# Patient Record
Sex: MALE | Race: BLACK OR AFRICAN AMERICAN | NOT HISPANIC OR LATINO | Employment: FULL TIME | URBAN - METROPOLITAN AREA
[De-identification: names, ages, dates, MRNs, and addresses within clinical notes are randomized per-mention and may not be internally consistent; named-entity substitution may affect disease eponyms.]

---

## 2023-02-19 PROBLEM — J30.9 ALLERGIC RHINITIS: Status: ACTIVE | Noted: 2023-02-19

## 2023-02-19 PROBLEM — R07.0 THROAT PAIN: Status: ACTIVE | Noted: 2023-02-19

## 2023-02-19 PROBLEM — N52.9 MALE ERECTILE DISORDER: Status: ACTIVE | Noted: 2023-02-19

## 2023-02-19 PROBLEM — R39.15 URINARY URGENCY: Status: ACTIVE | Noted: 2023-02-19

## 2023-02-19 PROBLEM — S82.123A CLOSED FRACTURE OF LATERAL CONDYLE OF TIBIA: Status: ACTIVE | Noted: 2023-02-19

## 2023-02-19 PROBLEM — E78.5 HYPERLIPIDEMIA: Status: ACTIVE | Noted: 2023-02-19

## 2023-02-19 PROBLEM — E66.9 OBESITY: Status: ACTIVE | Noted: 2023-02-19

## 2023-02-19 PROBLEM — M25.561 RIGHT KNEE PAIN: Status: ACTIVE | Noted: 2023-02-19

## 2023-02-19 PROBLEM — E11.9 DIABETES MELLITUS, TYPE 2 (MULTI): Status: ACTIVE | Noted: 2023-02-19

## 2023-02-19 PROBLEM — I10 HYPERTENSION: Status: ACTIVE | Noted: 2023-02-19

## 2023-02-19 PROBLEM — D3A.026 CARCINOID TUMOR OF RECTUM (CMS-HCC): Status: ACTIVE | Noted: 2023-02-19

## 2023-02-19 PROBLEM — R22.0 LOCALIZED SWELLING, MASS AND LUMP, HEAD: Status: ACTIVE | Noted: 2023-02-19

## 2023-02-19 PROBLEM — R35.0 FREQUENT URINATION: Status: ACTIVE | Noted: 2023-02-19

## 2023-02-19 PROBLEM — S82.141A CLOSED FRACTURE OF RIGHT TIBIAL PLATEAU: Status: ACTIVE | Noted: 2023-02-19

## 2023-02-19 PROBLEM — J36 PERITONSILLAR ABSCESS: Status: ACTIVE | Noted: 2023-02-19

## 2023-02-19 PROBLEM — J35.1 HYPERTROPHY OF TONSIL: Status: ACTIVE | Noted: 2023-02-19

## 2023-02-19 PROBLEM — G47.19 DAYTIME HYPERSOMNOLENCE: Status: ACTIVE | Noted: 2023-02-19

## 2023-02-19 PROBLEM — I10 BENIGN ESSENTIAL HYPERTENSION: Status: ACTIVE | Noted: 2023-02-19

## 2023-02-19 RX ORDER — ATORVASTATIN CALCIUM 40 MG/1
40 TABLET, FILM COATED ORAL DAILY
COMMUNITY
End: 2023-04-28 | Stop reason: SDUPTHER

## 2023-02-19 RX ORDER — METFORMIN HYDROCHLORIDE 500 MG/1
1000 TABLET ORAL 2 TIMES DAILY
COMMUNITY
End: 2023-04-28 | Stop reason: DRUGHIGH

## 2023-02-19 RX ORDER — PEN NEEDLE, DIABETIC 30 GX3/16"
NEEDLE, DISPOSABLE MISCELLANEOUS
COMMUNITY
End: 2024-05-07 | Stop reason: WASHOUT

## 2023-02-19 RX ORDER — ASPIRIN 81 MG/1
1 TABLET ORAL DAILY
COMMUNITY
Start: 2020-08-30 | End: 2023-04-28 | Stop reason: SDUPTHER

## 2023-02-19 RX ORDER — INSULIN LISPRO 100 [IU]/ML
INJECTION, SUSPENSION SUBCUTANEOUS 3 TIMES DAILY
COMMUNITY
End: 2023-05-26

## 2023-02-19 RX ORDER — LANCETS
EACH MISCELLANEOUS
COMMUNITY
End: 2024-05-07 | Stop reason: WASHOUT

## 2023-02-19 RX ORDER — ISOPROPYL ALCOHOL 70 ML/100ML
SWAB TOPICAL
COMMUNITY
End: 2024-05-07 | Stop reason: WASHOUT

## 2023-02-19 RX ORDER — BLOOD SUGAR DIAGNOSTIC
STRIP MISCELLANEOUS
COMMUNITY
Start: 2022-06-13 | End: 2024-05-07 | Stop reason: WASHOUT

## 2023-02-19 RX ORDER — BLOOD-GLUCOSE METER
KIT MISCELLANEOUS
COMMUNITY
Start: 2022-04-22 | End: 2024-05-07 | Stop reason: WASHOUT

## 2023-02-19 RX ORDER — LISINOPRIL 20 MG/1
1 TABLET ORAL DAILY
COMMUNITY
Start: 2019-06-21 | End: 2023-04-28 | Stop reason: SDUPTHER

## 2023-02-19 RX ORDER — AMLODIPINE BESYLATE 10 MG/1
1 TABLET ORAL DAILY
COMMUNITY
Start: 2019-02-15 | End: 2023-04-28 | Stop reason: SDUPTHER

## 2023-02-19 RX ORDER — INSULIN GLARGINE 100 [IU]/ML
5 INJECTION, SOLUTION SUBCUTANEOUS NIGHTLY
COMMUNITY
Start: 2022-05-10 | End: 2023-04-28 | Stop reason: SDUPTHER

## 2023-04-07 ENCOUNTER — APPOINTMENT (OUTPATIENT)
Dept: PRIMARY CARE | Facility: CLINIC | Age: 58
End: 2023-04-07
Payer: COMMERCIAL

## 2023-04-28 ENCOUNTER — OFFICE VISIT (OUTPATIENT)
Dept: PRIMARY CARE | Facility: CLINIC | Age: 58
End: 2023-04-28
Payer: COMMERCIAL

## 2023-04-28 VITALS
SYSTOLIC BLOOD PRESSURE: 147 MMHG | HEIGHT: 68 IN | TEMPERATURE: 98.2 F | OXYGEN SATURATION: 97 % | HEART RATE: 86 BPM | WEIGHT: 178 LBS | DIASTOLIC BLOOD PRESSURE: 98 MMHG | BODY MASS INDEX: 26.98 KG/M2

## 2023-04-28 DIAGNOSIS — I10 BENIGN ESSENTIAL HYPERTENSION: ICD-10-CM

## 2023-04-28 DIAGNOSIS — Z12.11 COLON CANCER SCREENING: ICD-10-CM

## 2023-04-28 DIAGNOSIS — E11.9 TYPE 2 DIABETES MELLITUS WITHOUT COMPLICATION, WITHOUT LONG-TERM CURRENT USE OF INSULIN (MULTI): Primary | ICD-10-CM

## 2023-04-28 DIAGNOSIS — E78.5 HYPERLIPIDEMIA, UNSPECIFIED HYPERLIPIDEMIA TYPE: ICD-10-CM

## 2023-04-28 LAB
POC APPEARANCE, URINE: CLEAR
POC BILIRUBIN, URINE: NEGATIVE
POC BLOOD, URINE: NEGATIVE
POC COLOR, URINE: ABNORMAL
POC FINGERSTICK BLOOD GLUCOSE: 386 MG/DL (ref 70–100)
POC GLUCOSE, URINE: ABNORMAL MG/DL
POC KETONES, URINE: ABNORMAL MG/DL
POC LEUKOCYTES, URINE: NEGATIVE
POC NITRITE,URINE: NEGATIVE
POC PH, URINE: 5.5 PH
POC PROTEIN, URINE: NEGATIVE MG/DL
POC SPECIFIC GRAVITY, URINE: 1.02
POC UROBILINOGEN, URINE: 0.2 EU/DL

## 2023-04-28 PROCEDURE — 81003 URINALYSIS AUTO W/O SCOPE: CPT | Performed by: STUDENT IN AN ORGANIZED HEALTH CARE EDUCATION/TRAINING PROGRAM

## 2023-04-28 PROCEDURE — 82962 GLUCOSE BLOOD TEST: CPT | Performed by: STUDENT IN AN ORGANIZED HEALTH CARE EDUCATION/TRAINING PROGRAM

## 2023-04-28 PROCEDURE — 3080F DIAST BP >= 90 MM HG: CPT | Performed by: STUDENT IN AN ORGANIZED HEALTH CARE EDUCATION/TRAINING PROGRAM

## 2023-04-28 PROCEDURE — 4010F ACE/ARB THERAPY RXD/TAKEN: CPT | Performed by: STUDENT IN AN ORGANIZED HEALTH CARE EDUCATION/TRAINING PROGRAM

## 2023-04-28 PROCEDURE — 99213 OFFICE O/P EST LOW 20 MIN: CPT | Performed by: STUDENT IN AN ORGANIZED HEALTH CARE EDUCATION/TRAINING PROGRAM

## 2023-04-28 PROCEDURE — 1036F TOBACCO NON-USER: CPT | Performed by: STUDENT IN AN ORGANIZED HEALTH CARE EDUCATION/TRAINING PROGRAM

## 2023-04-28 PROCEDURE — 3077F SYST BP >= 140 MM HG: CPT | Performed by: STUDENT IN AN ORGANIZED HEALTH CARE EDUCATION/TRAINING PROGRAM

## 2023-04-28 RX ORDER — INSULIN GLARGINE 100 [IU]/ML
20 INJECTION, SOLUTION SUBCUTANEOUS NIGHTLY
Qty: 3 ML | Refills: 3 | Status: SHIPPED | OUTPATIENT
Start: 2023-04-28 | End: 2023-05-26 | Stop reason: SDUPTHER

## 2023-04-28 RX ORDER — ATORVASTATIN CALCIUM 40 MG/1
40 TABLET, FILM COATED ORAL DAILY
Qty: 90 TABLET | Refills: 1 | Status: SHIPPED | OUTPATIENT
Start: 2023-04-28 | End: 2024-05-04

## 2023-04-28 RX ORDER — AMLODIPINE BESYLATE 10 MG/1
10 TABLET ORAL DAILY
Qty: 90 TABLET | Refills: 1 | Status: SHIPPED | OUTPATIENT
Start: 2023-04-28 | End: 2024-05-04

## 2023-04-28 RX ORDER — ASPIRIN 81 MG/1
81 TABLET ORAL DAILY
Qty: 90 TABLET | Refills: 1 | Status: SHIPPED | OUTPATIENT
Start: 2023-04-28 | End: 2024-05-07 | Stop reason: WASHOUT

## 2023-04-28 RX ORDER — METFORMIN HYDROCHLORIDE 500 MG/1
500 TABLET ORAL
Qty: 60 TABLET | Refills: 11 | Status: SHIPPED | OUTPATIENT
Start: 2023-04-28 | End: 2024-05-07

## 2023-04-28 RX ORDER — LISINOPRIL 20 MG/1
20 TABLET ORAL DAILY
Qty: 90 TABLET | Refills: 1 | Status: SHIPPED | OUTPATIENT
Start: 2023-04-28 | End: 2023-05-26 | Stop reason: DRUGHIGH

## 2023-04-28 ASSESSMENT — PAIN SCALES - GENERAL: PAINLEVEL: 0-NO PAIN

## 2023-04-28 NOTE — PROGRESS NOTES
Subjective   Patient ID: Belén Menon is a 57 y.o. male with PMHx of poorly controlled DM and HTN who presents for Follow-up (For High BP).  HPI    #HTN  - IO BP: 162/112 and 147/98 HR 86 on repeat  - Has BP cuff, but does not like doing it. Occasionally goes to drug store to get BP checked. BP was 139/89. Per patient, SBP is in the 130s-140s.   - Current Medications: Amlodipine 10mg every day, Lisinopril 20mg every day  - Has not taken any of his medications since January because he has been exercising and eating healthier. Lost about 30 pounds since 5/2022  - Diet: Eats mixed vegetables. Endorses low sodium  - Exercise frequently     - Goal: Drinks 8 bottles/day     #T2DM  - Current Medications: Not taking anything currently. Was previously prescribed Lantus 5u and metformin 1000mg BID   - 4/12/22 Hgb A1c 11.2  - Endorses polyuria and polydipsia. Denies any diaphoresis, vision changes, N/V, abdominal pain.   - Was previously prescribed lantus 5u at bedtime  - Has not eaten anything yet today.  - Checked AM BS at home today and it was 330s and it was 386 IO today.   - IO UA showed +4 glucose and +3 ketones  - Diet and exercise as above    #Reported Bloody stool  - Reports 4 episodes of bright red blood after defecation in the past couple of months. States that he was straining at the time. Endorses blood when wiping.   - 2018 Colonoscopy showed one 2mm polyp. Due for repeat in 5 years.   - Denies any abdominal pain. Denies any other symptoms.     Review of Systems   All other systems reviewed and are negative.  Unless mentioned in HPI; 10 systems reviewed     Objective   Physical Exam  Vitals reviewed.   Constitutional:       General: He is not in acute distress.     Appearance: Normal appearance. He is not ill-appearing.   HENT:      Head: Normocephalic and atraumatic.      Right Ear: External ear normal.      Left Ear: External ear normal.      Nose: Nose normal.      Mouth/Throat:      Mouth: Mucous membranes  "are moist.   Eyes:      Extraocular Movements: Extraocular movements intact.      Conjunctiva/sclera: Conjunctivae normal.   Cardiovascular:      Rate and Rhythm: Normal rate and regular rhythm.      Pulses: Normal pulses.      Heart sounds: Normal heart sounds. No murmur heard.     No friction rub. No gallop.   Pulmonary:      Effort: Pulmonary effort is normal. No respiratory distress.      Breath sounds: Normal breath sounds. No wheezing, rhonchi or rales.   Abdominal:      General: Bowel sounds are normal. There is no distension.      Palpations: Abdomen is soft. There is no mass.      Tenderness: There is no abdominal tenderness. There is no guarding or rebound.   Musculoskeletal:         General: No swelling, tenderness or deformity. Normal range of motion.      Cervical back: Normal range of motion and neck supple.      Right lower leg: No edema.      Left lower leg: No edema.   Skin:     General: Skin is warm and dry.      Capillary Refill: Capillary refill takes less than 2 seconds.   Neurological:      General: No focal deficit present.      Mental Status: He is alert and oriented to person, place, and time. Mental status is at baseline.   Psychiatric:         Mood and Affect: Mood normal.         Behavior: Behavior normal.         Thought Content: Thought content normal.         Judgment: Judgment normal.       BP (!) 162/112 (BP Location: Right arm, Patient Position: Sitting, BP Cuff Size: Adult long)   Pulse 86   Temp 36.8 °C (98.2 °F) (Temporal)   Ht 1.727 m (5' 8\")   Wt 80.7 kg (178 lb)   SpO2 97%   BMI 27.06 kg/m²    Lab Results   Component Value Date    WBC 5.4 04/24/2022    HGB 12.2 (L) 04/24/2022    HCT 36.3 (L) 04/24/2022    MCV 82 04/24/2022     (L) 04/24/2022      Lab Results   Component Value Date    GLUCOSE 473 (HH) 04/24/2022    CALCIUM 9.4 04/24/2022     (L) 04/24/2022    K 4.1 04/24/2022    CO2 21 04/24/2022    CL 98 04/24/2022    BUN 16 04/24/2022    CREATININE 1.07 " 04/24/2022        Assessment/Plan      Mr Menon is a 58 yo M who came to the clinic for HTN and DM follow up. Found to be severely hyperglycemic () and has ketonuria (+4). Fortunately asymptomatic, but was recommended for patient to go to the ED for DKA evaluation. Patient declined, and states that he would go to the ED later in the day when he takes care of his obligations. Plan as below:    #T2DM  #IO Hyperglycemia and ketonuria  - Uncontrolled; Not at goal d/t medications non-compliance  - Provided DM education and importance of medications compliance  - Resumed metformin at 500mg BID (with plans for uptitration) and Lantus at an increased dose of 20u at bedtime   - Started Jardiance 10mg every day  - Advised to check BS daily before breakfast and record in log  - Given IO BG and ketonuria, recommended ED transfer but patient declined. Patient expressed understanding of risks and plans to go later in the day. Return/ED precautions discussed. IO insulin-R (only in stock) was not given as it had a longer onset, and we would be unable to recheck IO BS and clinical status as patient needed to leave.   - Encourage healthy diet and exercise.   - Ordered Hgb A1c and urine albumin    #HTN  - Not at goal d/t medications non-compliance; asymptomatic   - Resumed home BP medications: Lisinopril 20mg every day and amlodipine 10mg every day  - Advised to check BP at home a few times a week and record in BP log  - Encourage DASH diet and exercise  - Ordered CMP  - Return/ED precautions discussed     #HLD  - Ordered Lipid panel    #Bloody stool likely anal fissure  - Unable to fully address and evaluate due to running out of time. Patient brought up concern at the end of visit.   - Ordered repeat colonoscopy.       RTC in 1-2 weeks for HTN, DM follow up. Patient also wanted to discuss about GI issues, but ran out of time. Agreeable to see having a close follow up with  clinic    Discussed with Dr. Marvin Lund,  DO  PGY-3 Family Medicine

## 2023-05-01 NOTE — PROGRESS NOTES
I reviewed with the resident the medical history and the resident’s findings on physical examination.  I discussed with the resident the patient’s diagnosis and concur with the treatment plan as documented in the resident note.     Michael Wong MD

## 2023-05-26 ENCOUNTER — OFFICE VISIT (OUTPATIENT)
Dept: PRIMARY CARE | Facility: CLINIC | Age: 58
End: 2023-05-26
Payer: COMMERCIAL

## 2023-05-26 VITALS
OXYGEN SATURATION: 100 % | BODY MASS INDEX: 27.28 KG/M2 | SYSTOLIC BLOOD PRESSURE: 151 MMHG | WEIGHT: 180 LBS | HEART RATE: 75 BPM | HEIGHT: 68 IN | DIASTOLIC BLOOD PRESSURE: 92 MMHG | TEMPERATURE: 97.7 F

## 2023-05-26 DIAGNOSIS — E11.9 TYPE 2 DIABETES MELLITUS WITHOUT COMPLICATION, WITHOUT LONG-TERM CURRENT USE OF INSULIN (MULTI): Primary | ICD-10-CM

## 2023-05-26 DIAGNOSIS — I10 BENIGN ESSENTIAL HYPERTENSION: ICD-10-CM

## 2023-05-26 PROCEDURE — 3080F DIAST BP >= 90 MM HG: CPT | Performed by: STUDENT IN AN ORGANIZED HEALTH CARE EDUCATION/TRAINING PROGRAM

## 2023-05-26 PROCEDURE — 99214 OFFICE O/P EST MOD 30 MIN: CPT | Performed by: STUDENT IN AN ORGANIZED HEALTH CARE EDUCATION/TRAINING PROGRAM

## 2023-05-26 PROCEDURE — 4010F ACE/ARB THERAPY RXD/TAKEN: CPT | Performed by: STUDENT IN AN ORGANIZED HEALTH CARE EDUCATION/TRAINING PROGRAM

## 2023-05-26 PROCEDURE — 3077F SYST BP >= 140 MM HG: CPT | Performed by: STUDENT IN AN ORGANIZED HEALTH CARE EDUCATION/TRAINING PROGRAM

## 2023-05-26 RX ORDER — LISINOPRIL 40 MG/1
40 TABLET ORAL DAILY
Qty: 30 TABLET | Refills: 5 | Status: SHIPPED | OUTPATIENT
Start: 2023-05-26 | End: 2024-05-04 | Stop reason: SDUPTHER

## 2023-05-26 RX ORDER — ACETAMINOPHEN 500 MG
1 TABLET ORAL 3 TIMES WEEKLY
Qty: 1 KIT | Refills: 0 | Status: SHIPPED | OUTPATIENT
Start: 2023-05-26 | End: 2024-05-07 | Stop reason: SDUPTHER

## 2023-05-26 RX ORDER — INSULIN GLARGINE 100 [IU]/ML
35 INJECTION, SOLUTION SUBCUTANEOUS NIGHTLY
Qty: 3 ML | Refills: 3 | Status: SHIPPED | OUTPATIENT
Start: 2023-05-26 | End: 2024-05-07

## 2023-05-26 NOTE — PROGRESS NOTES
I saw and evaluated the patient. I personally obtained the key and critical portions of the history and physical exam or was physically present for key and critical portions performed by the resident/fellow. I reviewed the resident/fellow's documentation and discussed the patient with the resident/fellow. I agree with the resident/fellow's medical decision making as documented in the note.    Michael High MD

## 2023-05-26 NOTE — PROGRESS NOTES
Subjective   Patient ID: Belén Menon is a 57 y.o. male with a PMHx of T2DM and HTN who presents for Follow-up, Hypertension, and Diabetes. Reports that he started all his medications just about a week ago, except for metformin.     HPI    #HTN  - IO /92 HR 75 and similar on repeat   - Current Medications: Lisinopril 20mg every day, amlodipine 10mg every day   - Has BP cuff, but does not work well  - Diet: Endorses low sodium diet. Endorses heavy carbs but is trying to make change. Eating more frozen vegetables and fruits. Would like referral to nutritionist  - Asymptomatic     #T2DM  - Has ophthalmologist and due for follow up  - Current Medications: Lantus 25u at bedtime, Jardiance 10mg every day, but not metformin 500mg BID (although prescribed)   - AM BS on average: 300-500  - Endorses polydipsia and polyuria, but better. Denies any numbness/tingling.   - Exercise: push-ups, run/jump in place  -4/12/22 Hgb A1c 11.2  - Labs ordered at the last office visit were not done. States that he plans to get them done today    Review of Systems   All other systems reviewed and are negative.  Unless mentioned in HPI; 10 systems reviewed     Objective   Physical Exam  Vitals reviewed.   Constitutional:       General: He is not in acute distress.     Appearance: Normal appearance. He is not ill-appearing.   HENT:      Head: Normocephalic and atraumatic.      Right Ear: External ear normal.      Left Ear: External ear normal.      Nose: Nose normal.      Mouth/Throat:      Mouth: Mucous membranes are moist.   Eyes:      Extraocular Movements: Extraocular movements intact.      Conjunctiva/sclera: Conjunctivae normal.   Cardiovascular:      Rate and Rhythm: Normal rate and regular rhythm.      Pulses: Normal pulses.      Heart sounds: Normal heart sounds. No murmur heard.     No friction rub. No gallop.   Pulmonary:      Effort: Pulmonary effort is normal. No respiratory distress.      Breath sounds: Normal breath  "sounds. No wheezing, rhonchi or rales.   Abdominal:      General: Bowel sounds are normal. There is no distension.      Palpations: Abdomen is soft. There is no mass.      Tenderness: There is no abdominal tenderness. There is no guarding or rebound.   Musculoskeletal:         General: No swelling, tenderness or deformity. Normal range of motion.      Cervical back: Normal range of motion and neck supple.      Right lower leg: No edema.      Left lower leg: No edema.   Skin:     General: Skin is warm and dry.      Capillary Refill: Capillary refill takes less than 2 seconds.   Neurological:      General: No focal deficit present.      Mental Status: He is alert and oriented to person, place, and time. Mental status is at baseline.   Psychiatric:         Mood and Affect: Mood normal.         Behavior: Behavior normal.         Thought Content: Thought content normal.         Judgment: Judgment normal.       BP (!) 151/92 (BP Location: Left arm, Patient Position: Sitting)   Pulse 75   Temp 36.5 °C (97.7 °F) (Temporal)   Ht 1.727 m (5' 8\")   Wt 81.6 kg (180 lb)   SpO2 100%   BMI 27.37 kg/m²    Lab Results   Component Value Date    WBC 5.4 04/24/2022    HGB 12.2 (L) 04/24/2022    HCT 36.3 (L) 04/24/2022    MCV 82 04/24/2022     (L) 04/24/2022      Lab Results   Component Value Date    GLUCOSE 473 (HH) 04/24/2022    CALCIUM 9.4 04/24/2022     (L) 04/24/2022    K 4.1 04/24/2022    CO2 21 04/24/2022    CL 98 04/24/2022    BUN 16 04/24/2022    CREATININE 1.07 04/24/2022        Assessment/Plan      Mr Menon is a 58 yo M who came to the clinic for HTN and DM follow up . Both HTN and DM are not well controlled likely due to non-complaince, and possibly inadequate regimen. However, overall clinically stable. Plan as below:    #HTN  - Uncontrolled; asymptomatic   - Increased lisinopril to 40mg every day  - C/w amlodipine 10mg every day  - Ordered BP cuff   - Encourage DASH diet. Ordered nutritionist " referral  - Return/ED precautions discussed    #T2DM  - Uncontrolled; symptomatic  - Increased Lantus to 35u at bedtime  - C/w Jardiance and Metformin 500mg BID (refilled). Strongly encouraged patient to be complaint and take metformin as well.  - Advised to get labs that were ordered at the last office visit (lipid panel, CMP, A1c, urine albumin)  - Encourage healthy diet and exercise. Ordered nutritionist referral  - Return/ED precautions discussed    #Hx of bloody stool likely anal fissure   - Mentioned at the end of the last office visit. NOW RESOLVED     RTC in 2 weeks for HTN and DM follow up    Seen and discussed with Dr. Anila Lund, DO   PGY-3 Family Medicine

## 2023-11-07 ENCOUNTER — PREP FOR PROCEDURE (OUTPATIENT)
Dept: GASTROENTEROLOGY | Facility: HOSPITAL | Age: 58
End: 2023-11-07
Payer: COMMERCIAL

## 2024-03-06 DIAGNOSIS — Z12.11 COLON CANCER SCREENING: Primary | ICD-10-CM

## 2024-03-06 RX ORDER — POLYETHYLENE GLYCOL 3350, SODIUM CHLORIDE, SODIUM BICARBONATE, POTASSIUM CHLORIDE 420; 11.2; 5.72; 1.48 G/4L; G/4L; G/4L; G/4L
4000 POWDER, FOR SOLUTION ORAL ONCE
Qty: 4000 ML | Refills: 0 | Status: SHIPPED | OUTPATIENT
Start: 2024-03-06 | End: 2024-03-06

## 2024-03-06 NOTE — PROGRESS NOTES
Bowel preparation has been prescribed for patient's upcoming colonoscopy procedure.    Petr Geiger MD  Gastroenterology

## 2024-05-04 ENCOUNTER — HOSPITAL ENCOUNTER (EMERGENCY)
Facility: HOSPITAL | Age: 59
Discharge: HOME | End: 2024-05-04
Attending: STUDENT IN AN ORGANIZED HEALTH CARE EDUCATION/TRAINING PROGRAM
Payer: COMMERCIAL

## 2024-05-04 VITALS
SYSTOLIC BLOOD PRESSURE: 165 MMHG | TEMPERATURE: 98 F | RESPIRATION RATE: 16 BRPM | HEIGHT: 68 IN | DIASTOLIC BLOOD PRESSURE: 109 MMHG | BODY MASS INDEX: 23.49 KG/M2 | OXYGEN SATURATION: 98 % | WEIGHT: 155 LBS | HEART RATE: 96 BPM

## 2024-05-04 DIAGNOSIS — R20.2 TINGLING OF BOTH FEET: ICD-10-CM

## 2024-05-04 DIAGNOSIS — R73.9 HYPERGLYCEMIA: Primary | ICD-10-CM

## 2024-05-04 DIAGNOSIS — E78.5 HYPERLIPIDEMIA, UNSPECIFIED HYPERLIPIDEMIA TYPE: ICD-10-CM

## 2024-05-04 DIAGNOSIS — I10 BENIGN ESSENTIAL HYPERTENSION: ICD-10-CM

## 2024-05-04 DIAGNOSIS — E11.9 TYPE 2 DIABETES MELLITUS WITHOUT COMPLICATION, WITHOUT LONG-TERM CURRENT USE OF INSULIN (MULTI): ICD-10-CM

## 2024-05-04 LAB
ANION GAP BLDV CALCULATED.4IONS-SCNC: 11 MMOL/L (ref 10–25)
APPEARANCE UR: CLEAR
BASE EXCESS BLDV CALC-SCNC: 1.8 MMOL/L (ref -2–3)
BASOPHILS # BLD AUTO: 0.03 X10*3/UL (ref 0–0.1)
BASOPHILS NFR BLD AUTO: 0.9 %
BILIRUB UR STRIP.AUTO-MCNC: NEGATIVE MG/DL
BODY TEMPERATURE: 37 DEGREES CELSIUS
CA-I BLDV-SCNC: 1.21 MMOL/L (ref 1.1–1.33)
CHLORIDE BLDV-SCNC: 96 MMOL/L (ref 98–107)
COLOR UR: COLORLESS
EOSINOPHIL # BLD AUTO: 0.06 X10*3/UL (ref 0–0.7)
EOSINOPHIL NFR BLD AUTO: 1.7 %
ERYTHROCYTE [DISTWIDTH] IN BLOOD BY AUTOMATED COUNT: 13 % (ref 11.5–14.5)
EST. AVERAGE GLUCOSE BLD GHB EST-MCNC: 510 MG/DL
GLUCOSE BLDV-MCNC: 328 MG/DL (ref 74–99)
GLUCOSE UR STRIP.AUTO-MCNC: ABNORMAL MG/DL
HBA1C MFR BLD: 19.4 %
HCO3 BLDV-SCNC: 27.7 MMOL/L (ref 22–26)
HCT VFR BLD AUTO: 34.1 % (ref 41–52)
HCT VFR BLD EST: 36 % (ref 41–52)
HGB BLD-MCNC: 12.2 G/DL (ref 13.5–17.5)
HGB BLDV-MCNC: 12 G/DL (ref 13.5–17.5)
HOLD SPECIMEN: NORMAL
IMM GRANULOCYTES # BLD AUTO: 0.01 X10*3/UL (ref 0–0.7)
IMM GRANULOCYTES NFR BLD AUTO: 0.3 % (ref 0–0.9)
INHALED O2 CONCENTRATION: 21 %
KETONES UR STRIP.AUTO-MCNC: ABNORMAL MG/DL
LACTATE BLDV-SCNC: 1.7 MMOL/L (ref 0.4–2)
LEUKOCYTE ESTERASE UR QL STRIP.AUTO: NEGATIVE
LYMPHOCYTES # BLD AUTO: 1.4 X10*3/UL (ref 1.2–4.8)
LYMPHOCYTES NFR BLD AUTO: 40.5 %
MAGNESIUM SERPL-MCNC: 1.97 MG/DL (ref 1.6–2.4)
MCH RBC QN AUTO: 28.1 PG (ref 26–34)
MCHC RBC AUTO-ENTMCNC: 35.8 G/DL (ref 32–36)
MCV RBC AUTO: 79 FL (ref 80–100)
MONOCYTES # BLD AUTO: 0.29 X10*3/UL (ref 0.1–1)
MONOCYTES NFR BLD AUTO: 8.4 %
NEUTROPHILS # BLD AUTO: 1.67 X10*3/UL (ref 1.2–7.7)
NEUTROPHILS NFR BLD AUTO: 48.2 %
NITRITE UR QL STRIP.AUTO: NEGATIVE
NRBC BLD-RTO: 0 /100 WBCS (ref 0–0)
OXYHGB MFR BLDV: 15 % (ref 45–75)
PCO2 BLDV: 48 MM HG (ref 41–51)
PH BLDV: 7.37 PH (ref 7.33–7.43)
PH UR STRIP.AUTO: 5 [PH]
PLATELET # BLD AUTO: 194 X10*3/UL (ref 150–450)
PO2 BLDV: 17 MM HG (ref 35–45)
POTASSIUM BLDV-SCNC: 3.9 MMOL/L (ref 3.5–5.3)
PROT UR STRIP.AUTO-MCNC: NEGATIVE MG/DL
RBC # BLD AUTO: 4.34 X10*6/UL (ref 4.5–5.9)
RBC # UR STRIP.AUTO: NEGATIVE /UL
SAO2 % BLDV: 15 % (ref 45–75)
SODIUM BLDV-SCNC: 131 MMOL/L (ref 136–145)
SP GR UR STRIP.AUTO: 1.03
UROBILINOGEN UR STRIP.AUTO-MCNC: NORMAL MG/DL
WBC # BLD AUTO: 3.5 X10*3/UL (ref 4.4–11.3)

## 2024-05-04 PROCEDURE — 99284 EMERGENCY DEPT VISIT MOD MDM: CPT | Mod: 25

## 2024-05-04 PROCEDURE — 96361 HYDRATE IV INFUSION ADD-ON: CPT

## 2024-05-04 PROCEDURE — 83036 HEMOGLOBIN GLYCOSYLATED A1C: CPT | Performed by: STUDENT IN AN ORGANIZED HEALTH CARE EDUCATION/TRAINING PROGRAM

## 2024-05-04 PROCEDURE — 2500000004 HC RX 250 GENERAL PHARMACY W/ HCPCS (ALT 636 FOR OP/ED): Mod: SE

## 2024-05-04 PROCEDURE — 84132 ASSAY OF SERUM POTASSIUM: CPT | Performed by: STUDENT IN AN ORGANIZED HEALTH CARE EDUCATION/TRAINING PROGRAM

## 2024-05-04 PROCEDURE — 99285 EMERGENCY DEPT VISIT HI MDM: CPT

## 2024-05-04 PROCEDURE — 96374 THER/PROPH/DIAG INJ IV PUSH: CPT

## 2024-05-04 PROCEDURE — 84132 ASSAY OF SERUM POTASSIUM: CPT

## 2024-05-04 PROCEDURE — 83735 ASSAY OF MAGNESIUM: CPT

## 2024-05-04 PROCEDURE — 36415 COLL VENOUS BLD VENIPUNCTURE: CPT

## 2024-05-04 PROCEDURE — 81003 URINALYSIS AUTO W/O SCOPE: CPT

## 2024-05-04 PROCEDURE — 85025 COMPLETE CBC W/AUTO DIFF WBC: CPT

## 2024-05-04 PROCEDURE — 2500000001 HC RX 250 WO HCPCS SELF ADMINISTERED DRUGS (ALT 637 FOR MEDICARE OP): Mod: SE

## 2024-05-04 PROCEDURE — 2500000002 HC RX 250 W HCPCS SELF ADMINISTERED DRUGS (ALT 637 FOR MEDICARE OP, ALT 636 FOR OP/ED): Mod: SE

## 2024-05-04 RX ORDER — ATORVASTATIN CALCIUM 40 MG/1
40 TABLET, FILM COATED ORAL DAILY
Qty: 30 TABLET | Refills: 0 | Status: SHIPPED | OUTPATIENT
Start: 2024-05-04 | End: 2024-06-05 | Stop reason: SDUPTHER

## 2024-05-04 RX ORDER — LISINOPRIL 40 MG/1
40 TABLET ORAL DAILY
Qty: 30 TABLET | Refills: 0 | Status: SHIPPED | OUTPATIENT
Start: 2024-05-04 | End: 2024-06-05 | Stop reason: SDUPTHER

## 2024-05-04 RX ORDER — AMLODIPINE BESYLATE 5 MG/1
10 TABLET ORAL DAILY
Status: DISCONTINUED | OUTPATIENT
Start: 2024-05-04 | End: 2024-05-04 | Stop reason: HOSPADM

## 2024-05-04 RX ORDER — LISINOPRIL 20 MG/1
40 TABLET ORAL DAILY
Status: DISCONTINUED | OUTPATIENT
Start: 2024-05-04 | End: 2024-05-04 | Stop reason: HOSPADM

## 2024-05-04 RX ORDER — DEXTROSE 50 % IN WATER (D50W) INTRAVENOUS SYRINGE
25
Status: DISCONTINUED | OUTPATIENT
Start: 2024-05-04 | End: 2024-05-04 | Stop reason: HOSPADM

## 2024-05-04 RX ORDER — DEXTROSE 50 % IN WATER (D50W) INTRAVENOUS SYRINGE
12.5
Status: DISCONTINUED | OUTPATIENT
Start: 2024-05-04 | End: 2024-05-04 | Stop reason: HOSPADM

## 2024-05-04 RX ORDER — AMLODIPINE BESYLATE 10 MG/1
10 TABLET ORAL DAILY
Qty: 30 TABLET | Refills: 0 | Status: SHIPPED | OUTPATIENT
Start: 2024-05-04 | End: 2024-06-05 | Stop reason: SDUPTHER

## 2024-05-04 RX ADMIN — INSULIN HUMAN 10 UNITS: 100 INJECTION, SOLUTION PARENTERAL at 18:25

## 2024-05-04 RX ADMIN — SODIUM CHLORIDE, POTASSIUM CHLORIDE, SODIUM LACTATE AND CALCIUM CHLORIDE 2000 ML: 600; 310; 30; 20 INJECTION, SOLUTION INTRAVENOUS at 18:13

## 2024-05-04 RX ADMIN — AMLODIPINE BESYLATE 10 MG: 5 TABLET ORAL at 18:25

## 2024-05-04 RX ADMIN — LISINOPRIL 40 MG: 20 TABLET ORAL at 18:25

## 2024-05-04 ASSESSMENT — COLUMBIA-SUICIDE SEVERITY RATING SCALE - C-SSRS
6. HAVE YOU EVER DONE ANYTHING, STARTED TO DO ANYTHING, OR PREPARED TO DO ANYTHING TO END YOUR LIFE?: NO
1. IN THE PAST MONTH, HAVE YOU WISHED YOU WERE DEAD OR WISHED YOU COULD GO TO SLEEP AND NOT WAKE UP?: NO
2. HAVE YOU ACTUALLY HAD ANY THOUGHTS OF KILLING YOURSELF?: NO

## 2024-05-04 NOTE — ED NOTES
The pt reports that he is a diabetic and last blood sugar today was 355. Pt states that he takes no medications for his diabetes at this time.Pt has been having thIs tingling feeling in bilat feet for about 2 weeks. Pt reports increased urination and polydipsia     Brad Torrez RN  05/04/24 4093

## 2024-05-04 NOTE — ED PROVIDER NOTES
HPI   Chief Complaint   Patient presents with    Foot Tingling       58-year-old male with history of noncompliant DM2 (noncompliant on insulin, metformin), HTN (noncompliant on lisinopril, amlodipine), and HLD presents for chief complaint of bilateral foot tingling.  Ongoing for 2 weeks.  Denies injury.  Denies pain.  Denies any numbness and states that he has good sensation, but states that it also feels tingly, as if his feet fell asleep.  Patient states that he lost weight intentionally from 230 pounds down to 155 pounds but states that he has been falling into bad habits again.  States that he did recently start drinking more water, partially due to his excessive thirst and partially due to wanting to change his diet for the better.  States that he has no interest in restarting metformin but might consider other medications.  He is planning on following up with primary care soon.  Denies fever, chills or myalgia.  Denies chest pain or dyspnea.  Denies any frequent urination or changes in urination otherwise.  No changes in bowel movements.  No rashes or swelling or wounds.                          Dodge Coma Scale Score: 15                     Patient History   Past Medical History:   Diagnosis Date    Essential (primary) hypertension 07/27/2020    Benign hypertension    Other conditions influencing health status 05/20/2020    Diabetes mellitus type 2, uncontrolled     No past surgical history on file.  Family History   Problem Relation Name Age of Onset    Diabetes Other      Hypertension Other       Social History     Tobacco Use    Smoking status: Some Days     Types: Cigarettes     Passive exposure: Never    Smokeless tobacco: Never   Substance Use Topics    Alcohol use: Not Currently    Drug use: Not Currently     Types: Marijuana       Physical Exam   ED Triage Vitals [05/04/24 1731]   Temperature Heart Rate Respirations BP   36.7 °C (98 °F) 96 16 (!) 165/109      Pulse Ox Temp Source Heart Rate Source  Patient Position   98 % Temporal Monitor --      BP Location FiO2 (%)     -- --       Physical Exam  Constitutional:       Appearance: Normal appearance.   HENT:      Head: Normocephalic and atraumatic.      Mouth/Throat:      Mouth: Mucous membranes are moist.      Pharynx: Oropharynx is clear.   Eyes:      Extraocular Movements: Extraocular movements intact.      Conjunctiva/sclera: Conjunctivae normal.      Pupils: Pupils are equal, round, and reactive to light.   Cardiovascular:      Rate and Rhythm: Normal rate and regular rhythm.      Pulses: Normal pulses.      Heart sounds: Normal heart sounds.   Pulmonary:      Effort: Pulmonary effort is normal.      Breath sounds: Normal breath sounds.   Abdominal:      General: Abdomen is flat.      Palpations: Abdomen is soft.   Musculoskeletal:         General: Normal range of motion.      Cervical back: Normal range of motion and neck supple.   Feet:      Comments: Bilateral feet on the plantar aspect with subjective tingling without objective findings on exam.  MSPs intact in all extremities.  Amatory steady gait.  Endorses good sensation on my exam.  Cap refill less than 2 seconds in all digits.  No wounds or erythema, no fluctuant masses.  Skin:     General: Skin is warm and dry.      Capillary Refill: Capillary refill takes less than 2 seconds.   Neurological:      General: No focal deficit present.      Mental Status: He is alert and oriented to person, place, and time.      GCS: GCS eye subscore is 4. GCS verbal subscore is 5. GCS motor subscore is 6.      Cranial Nerves: Cranial nerves 2-12 are intact.      Sensory: Sensation is intact.      Motor: Motor function is intact.      Coordination: Coordination is intact.   Psychiatric:         Mood and Affect: Mood normal.         Behavior: Behavior normal.         Thought Content: Thought content normal.         Judgment: Judgment normal.         ED Course & MDM   Diagnoses as of 05/04/24 1904   Hyperglycemia  "  Tingling of both feet       Medical Decision Making  Vital signs reviewed, significant for hypertension at 165/109.  Patient overall is well-appearing and in no apparent distress.  Speaks full sentences without difficulty.  States that he has interest in starting his blood pressure medications again and also would restart other diabetic medications when he follows up with primary care, just not metformin.  States that his sugar earlier today at home when he checked it was over 355.  Diagnostic testing performed.  Blood glucose level on venous full panel was greater than 685.  pH 7.41 and reassuring.  pCO2 low at 38 with bicarb normal at 24.1.  Lactate 1.1 with normal ranges.  Anion gap 15 and reassuring.  We ordered CBC, CMP, magnesium as a result.  Also checking urinalysis.  Started 2 L of LR and gave 10 of regular insulin.  Hypoglycemia order set also placed as precaution.  I discussed with the patient the severe risks regarding uncontrolled diabetes, hypertension, and hyperlipidemia, including neuropathy, which is likely the cause of his \"foot tingling\" today.  We also discussed things like high risk for strokes and heart attacks, as well as nephropathy and retinopathy.  Patient states that he understands and will take it more seriously.  He is interested in following up with primary care and restarting medication gave him doses of his home medications including amlodipine and lisinopril.  I offered metformin again but the patient states that he will not take this medication.  We are able to give him 10 units of regular insulin.  After the fluids finished and insulin had 30 minutes to work, we reassessed.  Repeat venous full panel showed much improved blood glucose level at 328.  Other values on the gas were reassuring again.  Patient was notified of this.  The other labs that were added on, including CBC, CMP, magnesium, A1c, urinalysis all have not returned back yet.  However the patient states that he needs " to leave.  We restarted him on his Jardiance as well as amlodipine, lisinopril, and statin.  Placed order for healthy at home and advised the patient to expect phone calls in the coming days.  Patient was counseled again on the importance of adherence to all medication.  We were unable to convince him to start metformin again.  States that he will implement diet changes and take medications as prescribed.  Advised to return with any new or worsening symptoms and to follow-up soon as possible with primary care as well.  Patient in agreement this plan.  Discharged in stable condition.  Patient not in DKA or HHS.        Procedure  Procedures     Elver Leonardo, EULALIO-RADHA  05/04/24 8961

## 2024-05-04 NOTE — ED TRIAGE NOTES
Pt presents to the ED with feet complaints. Pt states the bottom of his feet started tingling x 2 weeks ago. Pt states this feeling is present in both feet. Pt denies injury or trauma to extremities. Pt denies loss of sensation in bilateral feet. Pt steady ambulating in triage. Pt denies significant medical history, states he is supposed to be taking a medication for his blood pressure but has not received it.

## 2024-05-05 ENCOUNTER — PATIENT OUTREACH (OUTPATIENT)
Dept: CARE COORDINATION | Age: 59
End: 2024-05-05
Payer: COMMERCIAL

## 2024-05-05 LAB — HOLD SPECIMEN: NORMAL

## 2024-05-05 NOTE — PROGRESS NOTES
Spoke with patient today, enrollment SDOH, and SOCRN assessment complete. Discussed Healthy at Home enrollment with patient. Patient asking about medication education. He is also asking for assistance with utilities. Patient does not have a glucometer in the home. Patient aware daily call will begin tomorrow and [facility] initial visit with be on Tuesday.

## 2024-05-06 ENCOUNTER — PATIENT OUTREACH (OUTPATIENT)
Dept: HOME HEALTH SERVICES | Age: 59
End: 2024-05-06
Payer: COMMERCIAL

## 2024-05-06 ENCOUNTER — DOCUMENTATION (OUTPATIENT)
Dept: CARE COORDINATION | Age: 59
End: 2024-05-06
Payer: COMMERCIAL

## 2024-05-06 DIAGNOSIS — I10 BENIGN ESSENTIAL HYPERTENSION: Primary | ICD-10-CM

## 2024-05-06 DIAGNOSIS — E11.9 TYPE 2 DIABETES MELLITUS WITHOUT COMPLICATION, UNSPECIFIED WHETHER LONG TERM INSULIN USE (MULTI): ICD-10-CM

## 2024-05-06 NOTE — PROGRESS NOTES
Spoke with PT in regards to [facility] referral for utility assist. I referred the PT to [facility] and [facility]. Phone numbers and addresses were shared with the patient. Also [facility] walk in hours were given.    Discussed the following topics on behalf of the patient:  []  Behavioral Health Assistance     []  Case Management  []   Assistance  []  Digital Equity Assistance  []  Dental Health Assistance  []  Education Assistance  []  Employment Assistance  []  Financial Strain Relief Assistance  []  Food Insecurity Assistance  []  Healthcare Coverage Assistance  []  Housing Stability Assistance  []  IP Violence Relief Assistance  []  Legal Assistance  []  Physical Activity Assistance  []  Social Connection Assistance  []  Stress Relief Assistance   []  Substance Abuse Assistance  []  Transportation Assistance  [x]  Utility Assistance  []  Other: [insert comment here]            Candice Smith LCSW

## 2024-05-06 NOTE — PROGRESS NOTES
Daily Call Note:   Daily call completed. Patient states doing well. Patient states has not checked BS today. He states he dropped off his prescriptions for his glucometer and medication and will pick those up today. Offered assistance in scheduling a PCP appointment for patient. He states Dr. Lund, who he previously saw is not longer there. Patient states while he's out he will go to  to schedule an appointment with a new PCP. Patient advised that if he needs any assistance to let us know. Patient reminded of initial [facility] tomorrow, 5/7 @ 0900.  Pt Education: Yes  Barriers: None  Topics for Daily Review: BS monitoring, establishing new PCP  Pt demonstrates clear understanding: Yes    Daily Weight:  There were no vitals filed for this visit.   Last 3 Weights:  Wt Readings from Last 7 Encounters:   05/04/24 70.3 kg (155 lb)   05/26/23 81.6 kg (180 lb)   04/28/23 80.7 kg (178 lb)   05/16/22 95 kg (209 lb 8 oz)   04/22/22 100 kg (221 lb)   04/12/22 102 kg (224 lb 2 oz)   03/09/21 102 kg (225 lb 14.4 oz)       Masimo Device: No   Masimo Clinical Impression: N/A    Virtual Visits--Scheduled (Most Recent Date at Top)  Follow up Appointments  Recent Visits  No visits were found meeting these conditions.  Showing recent visits within past 30 days and meeting all other requirements  Future Appointments  No visits were found meeting these conditions.  Showing future appointments within next 90 days and meeting all other requirements       Frequency of RN Calls & Virtual Visits per Team Agreement: Healthy at Home Frequency: Daily    Medication issues Addressed (what was done): None    Follow up appointments scheduled by [facility] Staff: None  Referrals made by [facility] staff: None

## 2024-05-07 ENCOUNTER — TELEMEDICINE (OUTPATIENT)
Dept: PHARMACY | Facility: HOSPITAL | Age: 59
End: 2024-05-07
Payer: COMMERCIAL

## 2024-05-07 ENCOUNTER — PATIENT OUTREACH (OUTPATIENT)
Dept: HOME HEALTH SERVICES | Age: 59
End: 2024-05-07

## 2024-05-07 DIAGNOSIS — E11.9 TYPE 2 DIABETES MELLITUS WITHOUT COMPLICATION, WITHOUT LONG-TERM CURRENT USE OF INSULIN (MULTI): ICD-10-CM

## 2024-05-07 DIAGNOSIS — E11.9 TYPE 2 DIABETES MELLITUS WITHOUT COMPLICATION, UNSPECIFIED WHETHER LONG TERM INSULIN USE (MULTI): ICD-10-CM

## 2024-05-07 DIAGNOSIS — I10 BENIGN ESSENTIAL HYPERTENSION: ICD-10-CM

## 2024-05-07 LAB
ANION GAP BLDV CALCULATED.4IONS-SCNC: 15 MMOL/L (ref 10–25)
BASE EXCESS BLDV CALC-SCNC: -0.4 MMOL/L (ref -2–3)
BODY TEMPERATURE: 37 DEGREES CELSIUS
CA-I BLDV-SCNC: 1.18 MMOL/L (ref 1.1–1.33)
CHLORIDE BLDV-SCNC: 94 MMOL/L (ref 98–107)
GLUCOSE BLDV-MCNC: >685 MG/DL (ref 74–99)
HCO3 BLDV-SCNC: 24.1 MMOL/L (ref 22–26)
HCT VFR BLD EST: 38 % (ref 41–52)
HGB BLDV-MCNC: 12.7 G/DL (ref 13.5–17.5)
INHALED O2 CONCENTRATION: 21 %
LACTATE BLDV-SCNC: 1.1 MMOL/L (ref 0.4–2)
OXYHGB MFR BLDV: 76.5 % (ref 45–75)
PCO2 BLDV: 38 MM HG (ref 41–51)
PH BLDV: 7.41 PH (ref 7.33–7.43)
PO2 BLDV: 48 MM HG (ref 35–45)
POTASSIUM BLDV-SCNC: 4 MMOL/L (ref 3.5–5.3)
SAO2 % BLDV: 77 % (ref 45–75)
SODIUM BLDV-SCNC: 129 MMOL/L (ref 136–145)

## 2024-05-07 RX ORDER — BLOOD-GLUCOSE,RECEIVER,CONT
EACH MISCELLANEOUS
Qty: 1 EACH | Refills: 0 | Status: SHIPPED | OUTPATIENT
Start: 2024-05-07

## 2024-05-07 RX ORDER — INSULIN GLARGINE 100 [IU]/ML
20 INJECTION, SOLUTION SUBCUTANEOUS NIGHTLY
Qty: 15 ML | Refills: 0 | Status: SHIPPED | OUTPATIENT
Start: 2024-05-07 | End: 2024-05-22

## 2024-05-07 RX ORDER — BLOOD-GLUCOSE SENSOR
EACH MISCELLANEOUS
Qty: 2 EACH | Refills: 11 | Status: SHIPPED | OUTPATIENT
Start: 2024-05-07

## 2024-05-07 RX ORDER — ACETAMINOPHEN 500 MG
TABLET ORAL
Start: 2024-05-07

## 2024-05-07 NOTE — PROGRESS NOTES
Daily Call Note: [facility] weekly call complete w this RN, Alexis Pharm D and Dr Lucero.  Pt reports he is feeling well.    Pt to begin Lantus 20 units subcutaneous at bedtime  Medications reviewed w Alexis, no refills at this time.  Pt interested in CGM, Alexis will place order.    Pt has no B/P cuff, Alexis will order.  Pt not interested in meeting w Diabetic educator, states he has met w one on the past.    Pt states he will call to schedule w new PCP.      Pt Education:  POC   Barriers:   Topics for Daily Review:  BGM   Pt demonstrates clear understanding: Yes    Daily Weight:  There were no vitals filed for this visit.   Last 3 Weights:  Wt Readings from Last 7 Encounters:   05/04/24 70.3 kg (155 lb)   05/26/23 81.6 kg (180 lb)   04/28/23 80.7 kg (178 lb)   05/16/22 95 kg (209 lb 8 oz)   04/22/22 100 kg (221 lb)   04/12/22 102 kg (224 lb 2 oz)   03/09/21 102 kg (225 lb 14.4 oz)       Masimo Device: No   Masimo Clinical Impression:     Virtual Visits--Scheduled (Most Recent Date at Top)  Follow up Appointments  Recent Visits  No visits were found meeting these conditions.  Showing recent visits within past 30 days and meeting all other requirements  Future Appointments  No visits were found meeting these conditions.  Showing future appointments within next 90 days and meeting all other requirements       Frequency of RN Calls & Virtual Visits per Team Agreement: Healthy at Home Frequency: Daily    Medication issues Addressed (what was done):  Lantus 20 units subcutaneous at bedtime     Follow up appointments scheduled by [facility] Staff:   Referrals made by [facility] staff:               No

## 2024-05-07 NOTE — PROGRESS NOTES
Pharmacy Post-Discharge Visit    Belén Menon is a 58 y.o. male was referred to Clinical Pharmacy Team to complete a post-discharge medication optimization and monitoring visit.  The patient was referred for their blood pressure and diabetes management. He is also currently enrolled in our [facility]. He was not actually admitted to the hospital but had his ED visit on 5/4.      Referring Provider: Gene Lucero DO  PCP: No Assigned PCP Generic Provider, MD       Subjective   No Known Allergies    [id number], [place] - [address] AT [facility]  [address]  [facility] [address]  Phone: [phone] Fax: [phone]      Medication System Management:  Affordability/Accessibility: medicaid   Adherence/Organization: has been non-compliant for past couple of months but states has been taking medications since ED visit       Social History     Social History Narrative    Not on file        Notable Medication changes following discharge:  Start: amlodipine, lipitor, jardiance and lisinopril   Stop: none  Change: none    Diabetes  He presents for his initial diabetic visit. He has type 2 diabetes mellitus. There are no hypoglycemic associated symptoms. There are no hypoglycemic complications. Diabetic complications include peripheral neuropathy. Risk factors for coronary artery disease include diabetes mellitus, hypertension, male sex and tobacco exposure. Current diabetic treatment includes oral agent (monotherapy). His weight is stable. His overall blood glucose range is >200 mg/dl. An ACE inhibitor/angiotensin II receptor blocker is being taken.         Review of Systems        Objective     There were no vitals taken for this visit.   BP Readings from Last 4 Encounters:   05/04/24 (!) 165/109   05/26/23 (!) 151/92   04/28/23 (!) 147/98   05/16/22 117/77      There were no vitals filed for this visit.     LAB  Lab Results   Component Value Date    BILITOT 0.5  "04/24/2022    CALCIUM 9.4 04/24/2022    CO2 21 04/24/2022    CL 98 04/24/2022    CREATININE 1.07 04/24/2022    GLUCOSE 473 (HH) 04/24/2022    ALKPHOS 86 04/24/2022    K 4.1 04/24/2022    PROT 7.4 04/24/2022     (L) 04/24/2022    AST 15 04/24/2022    ALT 19 04/24/2022    BUN 16 04/24/2022    ANIONGAP 16 04/24/2022    MG 1.97 05/04/2024    PHOS 2.5 08/08/2020    ALBUMIN 3.9 04/24/2022    GFRMALE 82 04/24/2022     Lab Results   Component Value Date    TRIG 84 07/27/2020    CHOL 198 07/27/2020    HDL 36.7 (A) 07/27/2020     Lab Results   Component Value Date    HGBA1C 19.4 (H) 05/04/2024         Current Outpatient Medications   Medication Instructions    alcohol swabs pads, medicated Use 3 times daily, and as needed, to test blood sugar    amLODIPine (NORVASC) 10 mg, oral, Daily    aspirin 81 mg, oral, Daily    atorvastatin (LIPITOR) 40 mg, oral, Daily    blood pressure monitor (Blood Pressure Kit) kit 1 kit, miscellaneous, 3 times weekly    blood sugar diagnostic (iGlucose Test Strip) strip test 3 times daily and as needed    empagliflozin (JARDIANCE) 10 mg, oral, Daily    insulin glargine (LANTUS SOLOSTAR U-100 INSULIN) 35 Units, subcutaneous, Nightly    lancets misc Use 3 times a day and as needed to test blood sugar    lisinopril 40 mg, oral, Daily    metFORMIN (GLUCOPHAGE) 500 mg, oral, 2 times daily with meals    NON FORMULARY 1 each, Daily, VITAMIN D 50 MCG (2000 UT) ORAL CAPSULE    OneTouch Ultra Test strip test 3 times daily and as needed    pen needle, diabetic 31 gauge x 5/16\" needle miscellaneous, Use as directed 3 times daily, and as needed with sliding scale. (OK TO SUBSTITUTE WITH DIFFERENT NEEDLE IF COVERED BY INSURANCE)        HISTORICAL PHARMACOTHERAPY  Had stopped taking all medications at beginning of this year    DRUG INTERACTIONS  None at time of review      Assessment/Plan   Problem List Items Addressed This Visit       Benign essential hypertension    Diabetes mellitus, type 2 (Multi) "     HTN  Needs new BP cuff  Will order and have delivered from [facility] for him  Continue with amlodipine and lisinopril daily   Confirmed he did get the new rx's from his pharmacy and has been taking since being home  Told to keep log of BP's daily once he gets the cuff   DM  Has only been checking his sugars a few times since being home   Most recent check was yesterday morning and it was 355  A1c is now up to 19.4%   Does not want to be on metformin again, but did agree to going back on long acting insulin  Start 20 units lantus hs   Also agreed with wearing CGM so sugars can be better monitored   Increase jardiance to 25mg daily   [facility]   Nursing will continue with daily calls this week to help with monitoring sugars for any further adjustments if needed before next visit     Follow Up: 1 week     Continue all meds under the continuation of care with the referring provider and clinical pharmacy team.    Alexis Carmona, PharmD     Verbal consent to manage patient's drug therapy was obtained from the patient. They were informed they may decline to participate or withdraw from participation in pharmacy services at any time.

## 2024-05-09 ENCOUNTER — PATIENT OUTREACH (OUTPATIENT)
Dept: HOME HEALTH SERVICES | Age: 59
End: 2024-05-09
Payer: COMMERCIAL

## 2024-05-09 NOTE — PROGRESS NOTES
Daily Call Note:   Spoke to patient, he states he is doing good. Deneies any questions or concerns. He states he did  BG sensors from pharmacy but hasn't connected anything yet. He will do later today or tomorrow.   Has not made any appointments yet for follow up.   Reviewed next call, advised to call in if assistance needed for setting up sensors.     Pt Education: POC  Barriers: na  Topics for Daily Review: POC  Pt demonstrates clear understanding: Yes    Daily Weight:  There were no vitals filed for this visit.   Last 3 Weights:  Wt Readings from Last 7 Encounters:   05/04/24 70.3 kg (155 lb)   05/26/23 81.6 kg (180 lb)   04/28/23 80.7 kg (178 lb)   05/16/22 95 kg (209 lb 8 oz)   04/22/22 100 kg (221 lb)   04/12/22 102 kg (224 lb 2 oz)   03/09/21 102 kg (225 lb 14.4 oz)       Masimo Device: No   Masimo Clinical Impression: na    Virtual Visits--Scheduled (Most Recent Date at Top)  Follow up Appointments  Recent Visits  No visits were found meeting these conditions.  Showing recent visits within past 30 days and meeting all other requirements  Future Appointments  No visits were found meeting these conditions.  Showing future appointments within next 90 days and meeting all other requirements       Frequency of RN Calls & Virtual Visits per Team Agreement: [facility] Home Frequency: Daily    Medication issues Addressed (what was done): na    Follow up appointments scheduled by [facility] Staff: hussain  Referrals made by [facility] staff: hussain

## 2024-05-10 ENCOUNTER — PATIENT OUTREACH (OUTPATIENT)
Dept: HOME HEALTH SERVICES | Age: 59
End: 2024-05-10

## 2024-05-10 ENCOUNTER — DOCUMENTATION (OUTPATIENT)
Dept: CARE COORDINATION | Facility: CLINIC | Age: 59
End: 2024-05-10

## 2024-05-10 NOTE — PROGRESS NOTES
"Patient did not show for his scheduled 10:00 am ZOOM appointment; appointment held open, but closed at 10:17.  Sent the following message to patient regarding option to reschedule if he would like to do so.     \"Noah Melissa,  I am sorry you were unable to keep your 10:00am ZOOM appointment today.  If you would like to reschedule, please call and we will find a time to meet.\"  Thank you,    Guerline Smith MBA,MS,RDN,[facility]  Registered Dietitian  Certified Diabetes Care and   Cell:  [phone]    "

## 2024-05-10 NOTE — PROGRESS NOTES
Daily Call Note:   Spoke to patient, he states he is doing good. Denies any new symptoms. States he went to pharmacy and they explained how to apply new glucose sensor/monitor. He hasn't done it yet, states will do it today when he gets to sit down and read paperwork.   Denies any other needs, advised to call us if any questions.    Pt Education: POC  Barriers: na  Topics for Daily Review: BG monitoring  Pt demonstrates clear understanding: Yes    Daily Weight:  There were no vitals filed for this visit.   Last 3 Weights:  Wt Readings from Last 7 Encounters:   05/04/24 70.3 kg (155 lb)   05/26/23 81.6 kg (180 lb)   04/28/23 80.7 kg (178 lb)   05/16/22 95 kg (209 lb 8 oz)   04/22/22 100 kg (221 lb)   04/12/22 102 kg (224 lb 2 oz)   03/09/21 102 kg (225 lb 14.4 oz)       Masimo Device: No   Masimo Clinical Impression: na    Virtual Visits--Scheduled (Most Recent Date at Top)  Follow up Appointments  Recent Visits  No visits were found meeting these conditions.  Showing recent visits within past 30 days and meeting all other requirements  Future Appointments  No visits were found meeting these conditions.  Showing future appointments within next 90 days and meeting all other requirements       Frequency of RN Calls & Virtual Visits per Team Agreement: [facility] Home Frequency: Daily    Medication issues Addressed (what was done): na    Follow up appointments scheduled by [facility] Staff: hussain  Referrals made by [facility] staff: hussain

## 2024-05-11 ENCOUNTER — PATIENT OUTREACH (OUTPATIENT)
Dept: HOME HEALTH SERVICES | Age: 59
End: 2024-05-11
Payer: COMMERCIAL

## 2024-05-11 NOTE — PROGRESS NOTES
Daily call completed patient states he is doing ok he has only been checking his glucose level once a day every few days he states the last 2 times he checked it was 300 and 400 he confirms he has been using the Lantus 20 units @ HS and he has started the jardiance 2 days ago he picked up the CGM  but has not placed it yet he is requesting a medic visit Monday at 10 am to heve the medic help and show him how to use the BP cuff and the freestyle desirae states he has been taking his medications with no issues questions and concerns addressed

## 2024-05-13 ENCOUNTER — PATIENT OUTREACH (OUTPATIENT)
Dept: HOME HEALTH SERVICES | Age: 59
End: 2024-05-13
Payer: COMMERCIAL

## 2024-05-13 NOTE — PROGRESS NOTES
Daily call completed patient states he is doing ok FBG this am 240 still only wants to use 20 lantus @ HS Medic going to help get set up with CGM today questions and concerns addressed no medication needs

## 2024-05-15 ENCOUNTER — PATIENT OUTREACH (OUTPATIENT)
Dept: HOME HEALTH SERVICES | Age: 59
End: 2024-05-15

## 2024-05-15 ENCOUNTER — TELEMEDICINE (OUTPATIENT)
Dept: PHARMACY | Facility: HOSPITAL | Age: 59
End: 2024-05-15
Payer: COMMERCIAL

## 2024-05-15 DIAGNOSIS — E11.9 TYPE 2 DIABETES MELLITUS WITHOUT COMPLICATION, UNSPECIFIED WHETHER LONG TERM INSULIN USE (MULTI): Primary | ICD-10-CM

## 2024-05-15 NOTE — PROGRESS NOTES
1304-[facility] weekly-Morning blood sugar was 187 FBG. Last night it was 340 (highest it has been so far) after he has some pasta for dinner. Suggest to record .  Pt currently on lantus 20 units at night. Pt open to changing lantus dose after this weekend. Call was cut short by pt.  Next [facility] scheduled 5/22 @1030 . Advised pt we would be calling him daily so he needs to record blood sugars so we can chart.    0928-Pt has CGM on arm now. Blood sugar was 222 this morning, 187 now. Continues to take lantus HS. Pt has BP cuff at home and is planning on keeping a journal, has not checked BP today or yesterday. Rescheduled [facility] for today at 1300.      [facility] Transitions Assessment    Patient's Address:   [address] [place] [id number]  **  If this is not the address patient will receive services - alert team and address in EMR**       Patient Contacts:  Extended Emergency Contact Information  Primary Emergency Contact: Jamison Menon  Home Phone: [phone]  Relation: Sibling                                Patient's Preferred Phone: [phone]  Patient's E-mail: [email or web address]

## 2024-05-15 NOTE — PROGRESS NOTES
Pharmacy Post-Discharge Visit - Follow Up     Belén Menon is a 58 y.o. male was referred to Clinical Pharmacy Team to complete a post-discharge medication optimization and monitoring visit.  The patient was referred for their diabetes management while also enrolled in [facility].       Referring Provider: Gene Lucero DO  PCP: No Assigned PCP Generic Provider, MD       Subjective   No Known Allergies    [id number] [place] - [address] AT [facility]  [address]  [facility] [place] [address]  Phone: [phone] Fax: [phone]      Medication System Management:  Affordability/Accessibility: medicaid   Adherence/Organization: no concerns       Social History     Social History Narrative    Not on file          HPI  Diabetes  He presents for his initial diabetic visit. He has type 2 diabetes mellitus. There are no hypoglycemic associated symptoms. There are no hypoglycemic complications. Diabetic complications include peripheral neuropathy. Risk factors for coronary artery disease include diabetes mellitus, hypertension, male sex and tobacco exposure. Current diabetic treatment includes oral agent (monotherapy). His weight is stable. His overall blood glucose range is 180-300 mg/dl. An ACE inhibitor/angiotensin II receptor blocker is being taken.     Review of Systems        Objective     There were no vitals taken for this visit.   BP Readings from Last 4 Encounters:   05/04/24 (!) 165/109   05/26/23 (!) 151/92   04/28/23 (!) 147/98   05/16/22 117/77      There were no vitals filed for this visit.     LAB  Lab Results   Component Value Date    BILITOT 0.5 04/24/2022    CALCIUM 9.4 04/24/2022    CO2 21 04/24/2022    CL 98 04/24/2022    CREATININE 1.07 04/24/2022    GLUCOSE 473 (HH) 04/24/2022    ALKPHOS 86 04/24/2022    K 4.1 04/24/2022    PROT 7.4 04/24/2022     (L) 04/24/2022    AST 15 04/24/2022    ALT 19 04/24/2022    BUN 16 04/24/2022    ANIONGAP 16 04/24/2022    MG 1.97 05/04/2024     PHOS 2.5 08/08/2020    ALBUMIN 3.9 04/24/2022    GFRMALE 82 04/24/2022     Lab Results   Component Value Date    TRIG 84 07/27/2020    CHOL 198 07/27/2020    HDL 36.7 (A) 07/27/2020     Lab Results   Component Value Date    HGBA1C 19.4 (H) 05/04/2024         Current Outpatient Medications   Medication Instructions    amLODIPine (NORVASC) 10 mg, oral, Daily    atorvastatin (LIPITOR) 40 mg, oral, Daily    blood pressure monitor kit Use as directed    blood-glucose meter,continuous (FreeStyle Elroy 3 Hematite) misc Use as instructed    blood-glucose sensor (FreeStyle Elroy 3 Sensor) device Use to check sugars. Change sensor every 14 days    empagliflozin (JARDIANCE) 25 mg, oral, Daily    Lantus Solostar U-100 Insulin 20 Units, subcutaneous, Nightly    lisinopril 40 mg, oral, Daily            Assessment/Plan   Problem List Items Addressed This Visit    None    He was not able to talk long because he had a tow truck pulling up while on visit to  his car and he had to go, but was agreeable to try and meet next week if at an earlier time in the day.     HTN  Ordered him a BP cuff to be delivered from [facility] [place]   Told to monitor and keep log daily   Continue with amlodipine and lisinopril daily   DM  A1c is now up to 19.4%   Was able to get set up with CGM and has been wearing   Fasting sugar today was 184 and that has been his lowest since being home   Highest sugar has still been in the 300's   Appetite still not the best either   Does not want to be on metformin again  He is fine with continuing lantus in the evening since it is only once daily but he does NOT want to be on any other insulin   Tried discussing increasing lantus but he wants to wait and see how his sugars do over the weekend   So for now, will continue lantus 20 units hs   Continue  jardiance 25mg daily   Nursing will continue with daily calls this week to help with monitoring sugars for any further adjustments if needed before next visit     Follow Up:  1 week     Continue all meds under the continuation of care with the referring provider and clinical pharmacy team.    Alexis Carmona PharmD     Verbal consent to manage patient's drug therapy was obtained from the patient. They were informed they may decline to participate or withdraw from participation in pharmacy services at any time.

## 2024-05-16 ENCOUNTER — PATIENT OUTREACH (OUTPATIENT)
Dept: HOME HEALTH SERVICES | Age: 59
End: 2024-05-16
Payer: COMMERCIAL

## 2024-05-16 NOTE — PROGRESS NOTES
Daiky call completed patient states he is doing ok FBG this am 222 after meal 322 discussed diet and making sure he is eating a balanced diet and eating at regular intervals to help keep glucose stable patient still wants to wait until Monday to increase his lantus no medication needs questions and concerns addressed

## 2024-05-17 ENCOUNTER — PATIENT OUTREACH (OUTPATIENT)
Dept: HOME HEALTH SERVICES | Age: 59
End: 2024-05-17
Payer: COMMERCIAL

## 2024-05-17 NOTE — PROGRESS NOTES
Daily Call Note: Daily call complete. Patient doing good. He reports blood sugar this morning 214 and on this call 239. Patient states that he had a peanut butter and jelly sandwich last night for a snack. Phone number provided to him for central scheduling so that he can call and get a PCP appointment scheduled, also gave him phone number for [facility] services to reschedule appointment that he missed on 5/10/24 for diabetic education, verbalized understanding. Patient states he does not want to take insulin more than once a day. He is taking 20 units lantus at night, does not want to take insulin at meal time or per a sliding scale. Patient does state that he is trying to eat more fruits and vegetables and salads. Next [facility] 5/22/24 at 1000, verbalized understanding. No other questions at this time.    Pt Education: diabetic education  Barriers: none  Topics for Daily Review: blood sugar  Pt demonstrates clear understanding: Yes    Daily Weight:  There were no vitals filed for this visit.   Last 3 Weights:  Wt Readings from Last 7 Encounters:   05/04/24 70.3 kg (155 lb)   05/26/23 81.6 kg (180 lb)   04/28/23 80.7 kg (178 lb)   05/16/22 95 kg (209 lb 8 oz)   04/22/22 100 kg (221 lb)   04/12/22 102 kg (224 lb 2 oz)   03/09/21 102 kg (225 lb 14.4 oz)       Masimo Device: No   Masimo Clinical Impression: n/a    Virtual Visits--Scheduled (Most Recent Date at Top)  Follow up Appointments  Recent Visits  No visits were found meeting these conditions.  Showing recent visits within past 30 days and meeting all other requirements  Future Appointments  No visits were found meeting these conditions.  Showing future appointments within next 90 days and meeting all other requirements       Frequency of RN Calls & Virtual Visits per Team Agreement: [facility] Home Frequency: Daily    Medication issues Addressed (what was done): see note    Follow up appointments scheduled by [facility] Staff: none  Referrals made by [facility] staff:  none

## 2024-05-18 ENCOUNTER — PATIENT OUTREACH (OUTPATIENT)
Dept: HOME HEALTH SERVICES | Age: 59
End: 2024-05-18
Payer: COMMERCIAL

## 2024-05-18 NOTE — PROGRESS NOTES
"Daily Call Note: Daily call complete. Patient reports doing good. Blood sugar this morning, \"around 250.\" Patient takes 20 units Lantus solostar at HS. He does not have meal time insulin prescribe, because  he does not want to take meal time insulin. Morning blood sugars yesterday were 214 and 239. LUANNE Weinstein, updated on patient status, awaiting further orders. Next [facility] 5/22/24 at 1000, verbalized understanding. No other questions at this time.     1030- Outreach to patient to notify to increase lantus insulin to 25 units at HS, per HONORIO. LUANNE Godinez, orders, patient verbalized understanding.    Pt Education: diabetic education  Barriers: none  Topics for Daily Review: blood sugar  Pt demonstrates clear understanding: Yes    Daily Weight:  There were no vitals filed for this visit.   Last 3 Weights:  Wt Readings from Last 7 Encounters:   05/04/24 70.3 kg (155 lb)   05/26/23 81.6 kg (180 lb)   04/28/23 80.7 kg (178 lb)   05/16/22 95 kg (209 lb 8 oz)   04/22/22 100 kg (221 lb)   04/12/22 102 kg (224 lb 2 oz)   03/09/21 102 kg (225 lb 14.4 oz)       Masimo Device: No   Masimo Clinical Impression: n/a    Virtual Visits--Scheduled (Most Recent Date at Top)  Follow up Appointments  Recent Visits  No visits were found meeting these conditions.  Showing recent visits within past 30 days and meeting all other requirements  Future Appointments  No visits were found meeting these conditions.  Showing future appointments within next 90 days and meeting all other requirements       Frequency of RN Calls & Virtual Visits per Team Agreement: [facility] at Home Frequency: Daily    Medication issues Addressed (what was done): none    Follow up appointments scheduled by [facility] Staff: none  Referrals made by [facility] staff: none             "

## 2024-05-20 ENCOUNTER — PATIENT OUTREACH (OUTPATIENT)
Dept: HOME HEALTH SERVICES | Age: 59
End: 2024-05-20
Payer: COMMERCIAL

## 2024-05-20 NOTE — PROGRESS NOTES
Daily Call Note: Daily call complete. Patient reports doing good. He reports blood sugar range 187- 260. When I asked patient if he increased the lantus to 25 units at , he reports that he stated that he was not going to start until this evening. I encouraged patient to start today and educated on effects of elevated blood sugar, patient verbalized understanding. Next [facility] 5/22/24 at 1000. No other questions at this time.    Pt Education: per POC  Barriers: none  Topics for Daily Review: blood sugar  Pt demonstrates clear understanding: Yes    Daily Weight:  There were no vitals filed for this visit.   Last 3 Weights:  Wt Readings from Last 7 Encounters:   05/04/24 70.3 kg (155 lb)   05/26/23 81.6 kg (180 lb)   04/28/23 80.7 kg (178 lb)   05/16/22 95 kg (209 lb 8 oz)   04/22/22 100 kg (221 lb)   04/12/22 102 kg (224 lb 2 oz)   03/09/21 102 kg (225 lb 14.4 oz)       Masimo Device: No   Masimo Clinical Impression: n/a    Virtual Visits--Scheduled (Most Recent Date at Top)  Follow up Appointments  Recent Visits  No visits were found meeting these conditions.  Showing recent visits within past 30 days and meeting all other requirements  Future Appointments  No visits were found meeting these conditions.  Showing future appointments within next 90 days and meeting all other requirements       Frequency of RN Calls & Virtual Visits per Team Agreement: [facility] Home Frequency: Daily    Medication issues Addressed (what was done): none    Follow up appointments scheduled by [facility] Staff: none  Referrals made by [facility] staff: none

## 2024-05-21 ENCOUNTER — PATIENT OUTREACH (OUTPATIENT)
Dept: HOME HEALTH SERVICES | Age: 59
End: 2024-05-21
Payer: COMMERCIAL

## 2024-05-21 NOTE — PROGRESS NOTES
Daily Call Note:   Patient states he did not increase Lantus to 25 units on 5/18, he increased Lantus too 25 units on the night of 5/20.     Blood sugar 220 mg/dl    Patient did not check vitals today.    Notified patient of [facility] on 5/22 and sent reminder text message.     Patient has no questions or concerns.      Pt Education:   Barriers:   Topics for Daily Review:   Pt demonstrates clear understanding:     Daily Weight:  There were no vitals filed for this visit.   Last 3 Weights:  Wt Readings from Last 7 Encounters:   05/04/24 70.3 kg (155 lb)   05/26/23 81.6 kg (180 lb)   04/28/23 80.7 kg (178 lb)   05/16/22 95 kg (209 lb 8 oz)   04/22/22 100 kg (221 lb)   04/12/22 102 kg (224 lb 2 oz)   03/09/21 102 kg (225 lb 14.4 oz)       Masimo Device:    Masimo Clinical Impression:     Virtual Visits--Scheduled (Most Recent Date at Top)  Follow up Appointments  Recent Visits  No visits were found meeting these conditions.  Showing recent visits within past 30 days and meeting all other requirements  Future Appointments  No visits were found meeting these conditions.  Showing future appointments within next 90 days and meeting all other requirements       Frequency of RN Calls & Virtual Visits per Team Agreement:     Medication issues Addressed (what was done):     Follow up appointments scheduled by [facility] Staff:   Referrals made by [facility] staff:

## 2024-05-22 ENCOUNTER — PATIENT OUTREACH (OUTPATIENT)
Dept: HOME HEALTH SERVICES | Age: 59
End: 2024-05-22

## 2024-05-22 ENCOUNTER — TELEMEDICINE (OUTPATIENT)
Dept: PHARMACY | Facility: HOSPITAL | Age: 59
End: 2024-05-22
Payer: COMMERCIAL

## 2024-05-22 DIAGNOSIS — E11.9 TYPE 2 DIABETES MELLITUS WITHOUT COMPLICATION, UNSPECIFIED WHETHER LONG TERM INSULIN USE (MULTI): Primary | ICD-10-CM

## 2024-05-22 DIAGNOSIS — E11.9 TYPE 2 DIABETES MELLITUS WITHOUT COMPLICATION, WITHOUT LONG-TERM CURRENT USE OF INSULIN (MULTI): ICD-10-CM

## 2024-05-22 RX ORDER — INSULIN GLARGINE 100 [IU]/ML
25 INJECTION, SOLUTION SUBCUTANEOUS NIGHTLY
Qty: 15 ML | Refills: 2 | Status: SHIPPED | OUTPATIENT
Start: 2024-05-22

## 2024-05-22 NOTE — PROGRESS NOTES
Pharmacy Post-Discharge Visit - Follow Up     Belén Menon is a 58 y.o. male was referred to Clinical Pharmacy Team to complete a post-discharge medication optimization and monitoring visit.  The patient was referred for their diabetes management while also enrolled in [facility].     Referring Provider: Vikash Ballesteros MD  PCP: Francesca Garcia, EULALIO-CNP - next visit: 5/28      Subjective   No Known Allergies    [id number] - [facility], [place] - [address] AT [facility]  [address]  [facility] [address]  Phone: [phone] Fax: [phone]      Medication System Management:  Affordability/Accessibility: medicaid   Adherence/Organization: no concerns       Social History     Social History Narrative    Not on file          HPI  Diabetes  He presents for his initial diabetic visit. He has type 2 diabetes mellitus. There are no hypoglycemic associated symptoms. There are no hypoglycemic complications. Diabetic complications include peripheral neuropathy. Risk factors for coronary artery disease include diabetes mellitus, hypertension, male sex and tobacco exposure. Current diabetic treatment includes oral agent (monotherapy). His weight is stable. His overall blood glucose range is 150-250 mg/dl. An ACE inhibitor/angiotensin II receptor blocker is being taken.     Review of Systems        Objective     There were no vitals taken for this visit.   BP Readings from Last 4 Encounters:   05/04/24 (!) 165/109   05/26/23 (!) 151/92   04/28/23 (!) 147/98   05/16/22 117/77      There were no vitals filed for this visit.     LAB  Lab Results   Component Value Date    BILITOT 0.5 04/24/2022    CALCIUM 9.4 04/24/2022    CO2 21 04/24/2022    CL 98 04/24/2022    CREATININE 1.07 04/24/2022    GLUCOSE 473 (HH) 04/24/2022    ALKPHOS 86 04/24/2022    K 4.1 04/24/2022    PROT 7.4 04/24/2022     (L) 04/24/2022    AST 15 04/24/2022    ALT 19 04/24/2022    BUN 16 04/24/2022    ANIONGAP 16 04/24/2022    MG 1.97 05/04/2024     PHOS 2.5 08/08/2020    ALBUMIN 3.9 04/24/2022    GFRMALE 82 04/24/2022     Lab Results   Component Value Date    TRIG 84 07/27/2020    CHOL 198 07/27/2020    HDL 36.7 (A) 07/27/2020     Lab Results   Component Value Date    HGBA1C 19.4 (H) 05/04/2024         Current Outpatient Medications   Medication Instructions    amLODIPine (NORVASC) 10 mg, oral, Daily    atorvastatin (LIPITOR) 40 mg, oral, Daily    blood pressure monitor kit Use as directed    blood-glucose meter,continuous (FreeStyle Elroy 3 [place]) misc Use as instructed    blood-glucose sensor (FreeStyle Elroy 3 Sensor) device Use to check sugars. Change sensor every 14 days    empagliflozin (JARDIANCE) 25 mg, oral, Daily    Lantus Solostar U-100 Insulin 20 Units, subcutaneous, Nightly    lisinopril 40 mg, oral, Daily            Assessment/Plan   Problem List Items Addressed This Visit    None    HTN  Ordered him a BP cuff to be delivered from [facility]   Told to monitor and keep log daily   Continue with amlodipine and lisinopril daily   DM  A1c is now up to 19.4%   Was able to get set up with CGM and has been wearing   Fasting sugar today was 166   He is starting to notice which foods are making his sugars go up (carbs and sugars) and also heavier meals so he has been trying to also eat smaller meals with more veggies   Does not want to be on metformin again  He is fine with continuing lantus in the evening since it is only once daily but he does NOT want to be on any other insulin   Will increase lantus to 25 units at bedtime   Continue jardiance 25mg daily     Follow Up: 1 week     Continue all meds under the continuation of care with the referring provider and clinical pharmacy team.    Alexis Carmona, PharmD     Verbal consent to manage patient's drug therapy was obtained from the patient. They were informed they may decline to participate or withdraw from participation in pharmacy services at any time.

## 2024-05-22 NOTE — PROGRESS NOTES
[facility] Call Note:   Spoke to patient, he reports feeling fine. Reports am , and ranges. Denies any new symptoms. Verified PCP apt and he requests endocrine apt be made for him. This nurse to make apt.  Provider reviewed meds and plan of care. Dietary advice given regarding carb intake and DM ed referral in place.   Next [facility] scheduled and call freq decreased.   Pt Education: POC  Barriers: na  Topics for Daily Review: POC  Pt demonstrates clear understanding: Yes    Daily Weight:  There were no vitals filed for this visit.   Last 3 Weights:  Wt Readings from Last 7 Encounters:   05/04/24 70.3 kg (155 lb)   05/26/23 81.6 kg (180 lb)   04/28/23 80.7 kg (178 lb)   05/16/22 95 kg (209 lb 8 oz)   04/22/22 100 kg (221 lb)   04/12/22 102 kg (224 lb 2 oz)   03/09/21 102 kg (225 lb 14.4 oz)       Masimo Device: na   Masimo Clinical Impression: nA    Virtual Visits--Scheduled (Most Recent Date at Top)  Follow up Appointments  Recent Visits  No visits were found meeting these conditions.  Showing recent visits within past 30 days and meeting all other requirements  Future Appointments  Date Type Provider Dept   05/28/24 Appointment EULALIO Morgan-CNP Do [id number] [facility]   Showing future appointments within next 90 days and meeting all other requirements       Frequency of RN Calls & Virtual Visits per Team Agreement: Healthy at Home Frequency: Bi-Weekly    Medication issues Addressed (what was done): na    Follow up appointments scheduled by [facility] Staff: na  Referrals made by [facility] staff: hussain

## 2024-05-24 ENCOUNTER — PATIENT OUTREACH (OUTPATIENT)
Dept: HOME HEALTH SERVICES | Age: 59
End: 2024-05-24
Payer: COMMERCIAL

## 2024-05-24 NOTE — PROGRESS NOTES
Daily Call Note:   Spoke to patient, he reports feeling great. Denies any needs or questions. States he is driving and advised if he needs anything over holiday please call and let us know. Reports everything has been good and he has been feeling great.  Reviewed next call.   Pt Education: POC  Barriers: na  Topics for Daily Review: POC  Pt demonstrates clear understanding: Yes    Daily Weight:  There were no vitals filed for this visit.   Last 3 Weights:  Wt Readings from Last 7 Encounters:   05/04/24 70.3 kg (155 lb)   05/26/23 81.6 kg (180 lb)   04/28/23 80.7 kg (178 lb)   05/16/22 95 kg (209 lb 8 oz)   04/22/22 100 kg (221 lb)   04/12/22 102 kg (224 lb 2 oz)   03/09/21 102 kg (225 lb 14.4 oz)       Masimo Device: No   Masimo Clinical Impression: na    Virtual Visits--Scheduled (Most Recent Date at Top)  Follow up Appointments  Recent Visits  No visits were found meeting these conditions.  Showing recent visits within past 30 days and meeting all other requirements  Future Appointments  Date Type Provider Dept   05/28/24 Appointment EULALIO Morgan-CNP Do [id number] [facility]   Showing future appointments within next 90 days and meeting all other requirements       Frequency of RN Calls & Virtual Visits per Team Agreement: Healthy at Home Frequency: Bi-Weekly    Medication issues Addressed (what was done): na    Follow up appointments scheduled by [facility] Staff: hussain  Referrals made by [facility] staff: hussain

## 2024-05-28 ENCOUNTER — PATIENT OUTREACH (OUTPATIENT)
Dept: HOME HEALTH SERVICES | Age: 59
End: 2024-05-28

## 2024-05-28 ENCOUNTER — APPOINTMENT (OUTPATIENT)
Dept: PRIMARY CARE | Facility: CLINIC | Age: 59
End: 2024-05-28
Payer: COMMERCIAL

## 2024-05-28 NOTE — PROGRESS NOTES
Daily Call Note:   Spoke to patient, he reports feeling fine. States sugars have been fine. He reports he replaced his sensor and cannot get it to sync to his phone. Medic advised to call and troubleshoot device.  Reviewed upcoming [facility], denies any other concerns.     Pt Education: POC  Barriers: na  Topics for Daily Review: POC  Pt demonstrates clear understanding: Yes    Daily Weight:  There were no vitals filed for this visit.   Last 3 Weights:  Wt Readings from Last 7 Encounters:   05/04/24 70.3 kg (155 lb)   05/26/23 81.6 kg (180 lb)   04/28/23 80.7 kg (178 lb)   05/16/22 95 kg (209 lb 8 oz)   04/22/22 100 kg (221 lb)   04/12/22 102 kg (224 lb 2 oz)   03/09/21 102 kg (225 lb 14.4 oz)       Masimo Device: No   Masimo Clinical Impression: na    Virtual Visits--Scheduled (Most Recent Date at Top)  Follow up Appointments  Recent Visits  No visits were found meeting these conditions.  Showing recent visits within past 30 days and meeting all other requirements  Future Appointments  Date Type Provider Dept   05/30/24 Appointment EULALIO Morgan-CNP Do [id number] [facility]   Showing future appointments within next 90 days and meeting all other requirements       Frequency of RN Calls & Virtual Visits per Team Agreement: Healthy at Home Frequency: Bi-Weekly    Medication issues Addressed (what was done): na    Follow up appointments scheduled by [facility] Staff: na  Referrals made by [facility] staff: hussain

## 2024-05-29 ENCOUNTER — TELEMEDICINE (OUTPATIENT)
Dept: PHARMACY | Facility: HOSPITAL | Age: 59
End: 2024-05-29
Payer: COMMERCIAL

## 2024-05-29 ENCOUNTER — PATIENT OUTREACH (OUTPATIENT)
Dept: HOME HEALTH SERVICES | Age: 59
End: 2024-05-29

## 2024-05-29 DIAGNOSIS — E11.9 TYPE 2 DIABETES MELLITUS WITHOUT COMPLICATION, UNSPECIFIED WHETHER LONG TERM INSULIN USE (MULTI): Primary | ICD-10-CM

## 2024-05-29 NOTE — PROGRESS NOTES
Pharmacy Post-Discharge Visit - Follow Up     Belén Menon is a 58 y.o. male was referred to Clinical Pharmacy Team to complete a post-discharge medication optimization and monitoring visit.  The patient was referred for their diabetes management while also enrolled in [facility].     Referring Provider: Gene Lucero DO  PCP: LUANNE Morgan - next visit: 5/30      Subjective   No Known Allergies    [id number] - [facility], [place] - [address] [facility]  [address]  [facility] [place] [address]  Phone: [phone] Fax: [phone]      Medication System Management:  Affordability/Accessibility: medicaid   Adherence/Organization: no concerns       Social History     Social History Narrative    Not on file          HPI  Diabetes  He presents for his initial diabetic visit. He has type 2 diabetes mellitus. There are no hypoglycemic associated symptoms. There are no hypoglycemic complications. Diabetic complications include peripheral neuropathy. Risk factors for coronary artery disease include diabetes mellitus, hypertension, male sex and tobacco exposure. Current diabetic treatment includes oral agent (monotherapy). His weight is stable. His overall blood glucose range is 150-250 mg/dl. An ACE inhibitor/angiotensin II receptor blocker is being taken.     Review of Systems        Objective     There were no vitals taken for this visit.   BP Readings from Last 4 Encounters:   05/04/24 (!) 165/109   05/26/23 (!) 151/92   04/28/23 (!) 147/98   05/16/22 117/77      There were no vitals filed for this visit.     LAB  Lab Results   Component Value Date    BILITOT 0.5 04/24/2022    CALCIUM 9.4 04/24/2022    CO2 21 04/24/2022    CL 98 04/24/2022    CREATININE 1.07 04/24/2022    GLUCOSE 473 (HH) 04/24/2022    ALKPHOS 86 04/24/2022    K 4.1 04/24/2022    PROT 7.4 04/24/2022     (L) 04/24/2022    AST 15 04/24/2022    ALT 19 04/24/2022    BUN 16 04/24/2022    ANIONGAP 16 04/24/2022    MG 1.97 05/04/2024  "   PHOS 2.5 08/08/2020    ALBUMIN 3.9 04/24/2022    GFRMALE 82 04/24/2022     Lab Results   Component Value Date    TRIG 84 07/27/2020    CHOL 198 07/27/2020    HDL 36.7 (A) 07/27/2020     Lab Results   Component Value Date    HGBA1C 19.4 (H) 05/04/2024         Current Outpatient Medications   Medication Instructions    amLODIPine (NORVASC) 10 mg, oral, Daily    atorvastatin (LIPITOR) 40 mg, oral, Daily    blood pressure monitor kit Use as directed    blood-glucose meter,continuous (FreeStyle Elroy 3 [place]) misc Use as instructed    blood-glucose sensor (FreeStyle Elroy 3 Sensor) device Use to check sugars. Change sensor every 14 days    empagliflozin (JARDIANCE) 25 mg, oral, Daily    Lantus Solostar U-100 Insulin 25 Units, subcutaneous, Nightly    lisinopril 40 mg, oral, Daily            Assessment/Plan   Problem List Items Addressed This Visit    None    Visit was short again for Mr. Menon because he was at the bank at time of the call. He was able to give us a couple sugar readings but was in a hurry to get off the phone.     HTN  Ordered him a BP cuff to be delivered from [facility]   Says he is checking and they have been \"good\" but not able to give exact numbers  Told to monitor and keep log daily   Continue with amlodipine and lisinopril daily   DM  A1c is now up to 19.4%   Set up with CGM, but had issues with when trying to replace so the  is sending him out a replacement for one  Fasting sugar today was 179  He is starting to notice which foods are making his sugars go up (carbs and sugars) and also heavier meals so he has been trying to also eat smaller meals with more veggies   Does not want to be on metformin again  He is fine with continuing lantus in the evening since it is only once daily but he does NOT want to be on any other insulin   Did not have time to discuss any changes today   Will continue lantus to 25 units at bedtime for now  Continue jardiance 25mg daily     Follow Up: will try " to follow up at better time next week     Continue all meds under the continuation of care with the referring provider and clinical pharmacy team.    Alexis Carmona PharmD     Verbal consent to manage patient's drug therapy was obtained from the patient. They were informed they may decline to participate or withdraw from participation in pharmacy services at any time.

## 2024-05-29 NOTE — PROGRESS NOTES
[facility] Call Note:   Spoke to patient, he reports feeling alright. Pt reports BG was 179 this AM and last evening it was 224. States when he applied new sensor yesterday it wasn't reading. Medic phoned yesterday to troubleshoot device. Denies any new symptoms or concerns, states he at bank in line and has to get going.   States bp has been fine but does not report readings.  Advised we will reschedule him for next week at this time to discuss graduation and continue with biweekly calls for one more week to ensure proper functioning of sensor and glucose monitoring.   Next [facility] scheduled.   Pt Education: POC  Barriers: na  Topics for Daily Review: POC  Pt demonstrates clear understanding: Yes    Daily Weight:  There were no vitals filed for this visit.   Last 3 Weights:  Wt Readings from Last 7 Encounters:   05/04/24 70.3 kg (155 lb)   05/26/23 81.6 kg (180 lb)   04/28/23 80.7 kg (178 lb)   05/16/22 95 kg (209 lb 8 oz)   04/22/22 100 kg (221 lb)   04/12/22 102 kg (224 lb 2 oz)   03/09/21 102 kg (225 lb 14.4 oz)       Masimo Device: No   Masimo Clinical Impression: na    Virtual Visits--Scheduled (Most Recent Date at Top)  Follow up Appointments  Recent Visits  No visits were found meeting these conditions.  Showing recent visits within past 30 days and meeting all other requirements  Future Appointments  Date Type Provider Dept   05/30/24 Appointment EULALIO Morgan-CNP Do [id number]   Showing future appointments within next 90 days and meeting all other requirements       Frequency of RN Calls & Virtual Visits per Team Agreement: Healthy at Home Frequency: Bi-Weekly    Medication issues Addressed (what was done): na    Follow up appointments scheduled by [facility] Staff: na  Referrals made by [facility] staff: hussain

## 2024-05-30 ENCOUNTER — OFFICE VISIT (OUTPATIENT)
Dept: PRIMARY CARE | Facility: CLINIC | Age: 59
End: 2024-05-30
Payer: COMMERCIAL

## 2024-05-30 VITALS — DIASTOLIC BLOOD PRESSURE: 90 MMHG | SYSTOLIC BLOOD PRESSURE: 150 MMHG | BODY MASS INDEX: 22.66 KG/M2 | WEIGHT: 149 LBS

## 2024-05-30 DIAGNOSIS — Z79.4 TYPE 2 DIABETES MELLITUS WITHOUT COMPLICATION, WITH LONG-TERM CURRENT USE OF INSULIN (MULTI): Primary | ICD-10-CM

## 2024-05-30 DIAGNOSIS — E11.9 TYPE 2 DIABETES MELLITUS WITHOUT COMPLICATION, WITH LONG-TERM CURRENT USE OF INSULIN (MULTI): Primary | ICD-10-CM

## 2024-05-30 DIAGNOSIS — E78.2 MIXED HYPERLIPIDEMIA: ICD-10-CM

## 2024-05-30 PROCEDURE — 4010F ACE/ARB THERAPY RXD/TAKEN: CPT

## 2024-05-30 PROCEDURE — 3080F DIAST BP >= 90 MM HG: CPT

## 2024-05-30 PROCEDURE — 3046F HEMOGLOBIN A1C LEVEL >9.0%: CPT

## 2024-05-30 PROCEDURE — 99204 OFFICE O/P NEW MOD 45 MIN: CPT

## 2024-05-30 PROCEDURE — 3077F SYST BP >= 140 MM HG: CPT

## 2024-05-30 ASSESSMENT — PATIENT HEALTH QUESTIONNAIRE - PHQ9
SUM OF ALL RESPONSES TO PHQ9 QUESTIONS 1 AND 2: 0
2. FEELING DOWN, DEPRESSED OR HOPELESS: NOT AT ALL
1. LITTLE INTEREST OR PLEASURE IN DOING THINGS: NOT AT ALL

## 2024-05-30 NOTE — PROGRESS NOTES
".Subjective   Patient ID: Belén Menon is a 58 y.o. male who presents for Follow-up and hospital follow up.   HPI  58 year old male with PMH of HTN, DM2, HLD.   HTN: Amlodipine 10mg, lisinopril 40mg  HLD: Atorvastatin 40mg  DM2: Jardiance 25mg, lantus 25U nightly     States that from 9195-5179 was off medication. Was recently discharged from hospital, most recent A1C was 19.4. Started on Jardiance and 25U lantus.   States that he stopped his lantus because he \"wanted to see what would happen\". Reports that \"sugars have been good, in 220s\".   Reviewed FBG and postpradial BG goals.   When he was taking insulin, he denied hypoglycemic events and reports that FB-170. Post prandial is up to 300s.    Has been trying to increase salad intake, stopped drinking sugar beverages.     All systems have been reviewed and are negative for complaint other than those mentioned in the HPI.     /90   Wt 67.6 kg (149 lb)   BMI 22.66 kg/m²    Objective   Physical Exam  Constitutional:       General: He is awake.      Appearance: Normal appearance.   HENT:      Head: Normocephalic and atraumatic.   Eyes:      Extraocular Movements: Extraocular movements intact.      Pupils: Pupils are equal, round, and reactive to light.   Cardiovascular:      Rate and Rhythm: Normal rate and regular rhythm.      Heart sounds: S1 normal and S2 normal. No murmur heard.  Pulmonary:      Effort: Pulmonary effort is normal.      Breath sounds: Normal breath sounds.   Musculoskeletal:      Cervical back: Normal range of motion and neck supple.      Right lower leg: No edema.      Left lower leg: No edema.   Skin:     General: Skin is warm and dry.   Neurological:      General: No focal deficit present.      Mental Status: He is alert and oriented to person, place, and time.   Psychiatric:         Mood and Affect: Mood and affect normal.         Behavior: Behavior normal. Behavior is cooperative.         Thought Content: Thought content " normal.         Judgment: Judgment normal.     Belén was seen today for follow-up.  Diagnoses and all orders for this visit:  Type 2 diabetes mellitus without complication, with long-term current use of insulin (Multi) (Primary)  -     NOT at goal, A1C 19.4  - Recommend restarting insulin, patient in agreement  - Continue jardiance  - Will likely need to titrate insulin until FBG in range  - Continue ACE and Statin  - Will discuss referrals for optho and podiatry at next appointment   - Recommend seeing diabetes educator, patient to schedule   - Continue working with pharmacy team to optimize medication and insulin dosing   - Referral to [facility]  -     Albumin, urine, random; Future  -     Comprehensive Metabolic Panel; Future  -     Lipid Panel; Future  -     TSH with reflex to Free T4 if abnormal; Future  -     Vitamin D 25-Hydroxy,Total (for eval of Vitamin D levels); Future  -     Albumin, urine, random; Future  -     Urinalysis with Reflex Microscopic; Future  Mixed hyperlipidemia   - Continue atorvastatin     Follow up in 1 month.

## 2024-05-31 ENCOUNTER — PATIENT OUTREACH (OUTPATIENT)
Dept: HOME HEALTH SERVICES | Age: 59
End: 2024-05-31
Payer: COMMERCIAL

## 2024-05-31 NOTE — PROGRESS NOTES
Daily Call Note:   Daily call, spoke to patient who reports feeling fine. Denies any new symptoms of high or low BG.   States he saw new PCP yesterday and went well. He will continue to see her.   See her note regarding meds and follow up plan.   Pt unable to check BG still this morning, new sensor on order. TO arrive today and pt advised to call in to set up.  Next call reviewed.   Pt Education: POC  Barriers: na  Topics for Daily Review: POC  Pt demonstrates clear understanding: Yes    Daily Weight:  There were no vitals filed for this visit.   Last 3 Weights:  Wt Readings from Last 7 Encounters:   05/30/24 67.6 kg (149 lb)   05/04/24 70.3 kg (155 lb)   05/26/23 81.6 kg (180 lb)   04/28/23 80.7 kg (178 lb)   05/16/22 95 kg (209 lb 8 oz)   04/22/22 100 kg (221 lb)   04/12/22 102 kg (224 lb 2 oz)       Masimo Device: No   Masimo Clinical Impression: nA    Virtual Visits--Scheduled (Most Recent Date at Top)  Follow up Appointments  Recent Visits  Date Type Provider Dept   05/30/24 Office Visit LUANEN Morgan Do [id number] [facility]   Showing recent visits within past 30 days and meeting all other requirements  Future Appointments  Date Type Provider Dept   07/11/24 Appointment LUANNE Morgan Do [id number] [facility]   Showing future appointments within next 90 days and meeting all other requirements       Frequency of RN Calls & Virtual Visits per Team Agreement: Healthy at Home Frequency: Bi-Weekly    Medication issues Addressed (what was done): na    Follow up appointments scheduled by [facility] Staff: na  Referrals made by [facility] staff: pop health from pcp

## 2024-06-04 ENCOUNTER — PATIENT OUTREACH (OUTPATIENT)
Dept: HOME HEALTH SERVICES | Age: 59
End: 2024-06-04
Payer: COMMERCIAL

## 2024-06-04 NOTE — PROGRESS NOTES
Daily Call Note:   Spoke to patient, he reports feeling fine. Reports last evening BG and this AM, states he ate late last night but sugars have been in 100's lately.   Complains of tingling pain in his toes, asked about neuropathy symptoms. He asked if DM socks can help. Advised yes diabetic socks can help, as well as discussing medication options on tomorrows [facility]. Expressed importance of BG control to help symptoms as well.   Reviewed next call. Denies further needs.    Pt Education: POC  Barriers: na  Topics for Daily Review: POC  Pt demonstrates clear understanding: Yes    Daily Weight:  There were no vitals filed for this visit.   Last 3 Weights:  Wt Readings from Last 7 Encounters:   05/30/24 67.6 kg (149 lb)   05/04/24 70.3 kg (155 lb)   05/26/23 81.6 kg (180 lb)   04/28/23 80.7 kg (178 lb)   05/16/22 95 kg (209 lb 8 oz)   04/22/22 100 kg (221 lb)   04/12/22 102 kg (224 lb 2 oz)       Masimo Device: No   Masimo Clinical Impression: na    Virtual Visits--Scheduled (Most Recent Date at Top)  Follow up Appointments  Recent Visits  Date Type Provider Dept   05/30/24 Office Visit LUANNE Morgan Do [id number] [facility]   Showing recent visits within past 30 days and meeting all other requirements  Future Appointments  Date Type Provider Dept   07/11/24 Appointment LUANEN Morgan Do [id number] [facility]   Showing future appointments within next 90 days and meeting all other requirements       Frequency of RN Calls & Virtual Visits per Team Agreement: Healthy at Home Frequency: Bi-Weekly    Medication issues Addressed (what was done): na    Follow up appointments scheduled by [facility] Staff: na  Referrals made by [facility] staff: hussain

## 2024-06-05 ENCOUNTER — TELEMEDICINE (OUTPATIENT)
Dept: PHARMACY | Facility: HOSPITAL | Age: 59
End: 2024-06-05
Payer: COMMERCIAL

## 2024-06-05 ENCOUNTER — PATIENT OUTREACH (OUTPATIENT)
Dept: HOME HEALTH SERVICES | Age: 59
End: 2024-06-05

## 2024-06-05 DIAGNOSIS — E11.9 TYPE 2 DIABETES MELLITUS WITHOUT COMPLICATION, UNSPECIFIED WHETHER LONG TERM INSULIN USE (MULTI): Primary | ICD-10-CM

## 2024-06-05 DIAGNOSIS — I10 BENIGN ESSENTIAL HYPERTENSION: ICD-10-CM

## 2024-06-05 DIAGNOSIS — E78.5 HYPERLIPIDEMIA, UNSPECIFIED HYPERLIPIDEMIA TYPE: ICD-10-CM

## 2024-06-05 RX ORDER — LISINOPRIL 40 MG/1
40 TABLET ORAL DAILY
Qty: 30 TABLET | Refills: 11 | Status: SHIPPED | OUTPATIENT
Start: 2024-06-05 | End: 2025-05-31

## 2024-06-05 RX ORDER — ATORVASTATIN CALCIUM 40 MG/1
40 TABLET, FILM COATED ORAL DAILY
Qty: 30 TABLET | Refills: 11 | Status: SHIPPED | OUTPATIENT
Start: 2024-06-05 | End: 2025-05-31

## 2024-06-05 RX ORDER — AMLODIPINE BESYLATE 10 MG/1
10 TABLET ORAL DAILY
Qty: 30 TABLET | Refills: 11 | Status: SHIPPED | OUTPATIENT
Start: 2024-06-05 | End: 2025-05-31

## 2024-06-05 NOTE — PROGRESS NOTES
[facility] Call Note:   Spoke to patient, he reports feeling ok. Reports AM .   Still taking Lantus 25units HS. He states he has been in 100's during days.   He complains of tingling in toes. Provider advised calling PCP to see if they want to start a med prior to follow up. Advised with improving BG levels, hopefully pain can improve, but will continue to monitor.   Goal to improve and maintain WNL BG and monitor symtpoms. Meds reviewed.  Follow up plan discussed. Follow up eye visits recommended, endocrine, and PCP visits.   Medic call arranged to connect new BG sensors for patient.  Agreeable for graduation today.   Pt graduated from [facility] program.   Medic connected with patient and he is all set with functioning Elroy.   Pt Education: POC  Barriers: na  Topics for Daily Review: POC  Pt demonstrates clear understanding: Yes    Daily Weight:  There were no vitals filed for this visit.   Last 3 Weights:  Wt Readings from Last 7 Encounters:   05/30/24 67.6 kg (149 lb)   05/04/24 70.3 kg (155 lb)   05/26/23 81.6 kg (180 lb)   04/28/23 80.7 kg (178 lb)   05/16/22 95 kg (209 lb 8 oz)   04/22/22 100 kg (221 lb)   04/12/22 102 kg (224 lb 2 oz)       Masimo Device: No   Masimo Clinical Impression: na    Virtual Visits--Scheduled (Most Recent Date at Top)  Follow up Appointments  Recent Visits  Date Type Provider Dept   05/30/24 Office Visit LUANNE Morgan Do [id number] [facility]   Showing recent visits within past 30 days and meeting all other requirements  Future Appointments  Date Type Provider Dept   07/11/24 Appointment LUANNE Morgan Do [id number] [facility]   Showing future appointments within next 90 days and meeting all other requirements       Frequency of RN Calls & Virtual Visits per Team Agreement: Healthy at Home Frequency: Bi-Weekly    Medication issues Addressed (what was done): na    Follow up appointments scheduled by [facility] Staff: na  Referrals made by [facility] staff: hussain

## 2024-06-05 NOTE — PROGRESS NOTES
Pharmacy Post-Discharge Visit - Follow Up     Belén Menon is a 58 y.o. male was referred to Clinical Pharmacy Team to complete a post-discharge medication optimization and monitoring visit.  The patient was referred for their blood pressure and diabetes management while also enrolled in [facility].     Referring Provider: Noah Fuentes MD  PCP: Francesca Garcia, EULALIO-CNP - last visit: 5/30/24, next visit: 7/11      Subjective   No Known Allergies    [id number] - [facility], [place] - [address] AT [facility]  [address] [facility] [place] [address]  Phone: [phone] Fax: [phone]      Medication System Management:  Affordability/Accessibility: medicaid   Adherence/Organization: no issues       Social History     Social History Narrative    Not on file          HPI  Diabetes  He presents for his initial diabetic visit. He has type 2 diabetes mellitus. There are no hypoglycemic associated symptoms. There are no hypoglycemic complications. Diabetic complications include peripheral neuropathy. Risk factors for coronary artery disease include diabetes mellitus, hypertension, male sex and tobacco exposure. Current diabetic treatment includes oral agent (monotherapy). His weight is stable. His overall blood glucose range is 150-250 mg/dl. An ACE inhibitor/angiotensin II receptor blocker is being taken.     Review of Systems        Objective     There were no vitals taken for this visit.   BP Readings from Last 4 Encounters:   05/30/24 150/90   05/04/24 (!) 165/109   05/26/23 (!) 151/92   04/28/23 (!) 147/98      There were no vitals filed for this visit.     LAB  Lab Results   Component Value Date    BILITOT 0.5 04/24/2022    CALCIUM 9.4 04/24/2022    CO2 21 04/24/2022    CL 98 04/24/2022    CREATININE 1.07 04/24/2022    GLUCOSE 473 (HH) 04/24/2022    ALKPHOS 86 04/24/2022    K 4.1 04/24/2022    PROT 7.4 04/24/2022     (L) 04/24/2022    AST 15 04/24/2022    ALT 19 04/24/2022    BUN 16 04/24/2022     "ANIONGAP 16 04/24/2022    MG 1.97 05/04/2024    PHOS 2.5 08/08/2020    ALBUMIN 3.9 04/24/2022    GFRMALE 82 04/24/2022     Lab Results   Component Value Date    TRIG 84 07/27/2020    CHOL 198 07/27/2020    HDL 36.7 (A) 07/27/2020     Lab Results   Component Value Date    HGBA1C 19.4 (H) 05/04/2024         Current Outpatient Medications   Medication Instructions    amLODIPine (NORVASC) 10 mg, oral, Daily    atorvastatin (LIPITOR) 40 mg, oral, Daily    blood pressure monitor kit Use as directed    blood-glucose meter,continuous (FreeStyle Elroy 3 [place]) misc Use as instructed    blood-glucose sensor (FreeStyle Elroy 3 Sensor) device Use to check sugars. Change sensor every 14 days    empagliflozin (JARDIANCE) 25 mg, oral, Daily    Lantus Solostar U-100 Insulin 25 Units, subcutaneous, Nightly    lisinopril 40 mg, oral, Daily            Assessment/Plan   Problem List Items Addressed This Visit    None    HTN  Ordered him a BP cuff to be delivered from [facility]   Says he is checking and they have been \"good\" but not able to give exact numbers  Told to monitor and keep log daily   Continue with amlodipine and lisinopril daily   DM  A1c is now up to 19.4%   Set up with CGM, but had issues with when trying to replace so the  is sending him out a replacement for one  Also would like to have our medic team go out and help him with setting up the new sensor again   Fasting sugar today was 167 and all fasting numbers have been below 200 this past week   He is starting to notice which foods are making his sugars go up (carbs and sugars) and also heavier meals so he has been trying to also eat smaller meals with more veggies   Does not want to be on metformin again  He is fine with continuing lantus in the evening since it is only once daily but he does NOT want to be on any other insulin   Sugars continuing to trend down and he is still working on diet so no changes today    Will continue lantus 25 units at bedtime "   Continue jardiance 25mg daily     Overall, doing a lot better compared to when he was first discharged. He has good follow up care with new pcp, Francesca Garcia and her office and will continue to be followed by clinical pharmacist with their group as well so we will plan to graduate from [facility] today. He also has all refills needed until next pcp visit. We did discuss he is able to still call with any questions or concerns since nursing is available 24/7.    Follow Up: as needed     Continue all meds under the continuation of care with the referring provider and clinical pharmacy team.    Alexis Carmona PharmD     Verbal consent to manage patient's drug therapy was obtained from the patient. They were informed they may decline to participate or withdraw from participation in pharmacy services at any time.

## 2024-06-18 ENCOUNTER — OFFICE VISIT (OUTPATIENT)
Dept: ENDOCRINOLOGY | Facility: HOSPITAL | Age: 59
End: 2024-06-18
Payer: COMMERCIAL

## 2024-06-18 VITALS
DIASTOLIC BLOOD PRESSURE: 92 MMHG | WEIGHT: 145 LBS | BODY MASS INDEX: 22.76 KG/M2 | SYSTOLIC BLOOD PRESSURE: 127 MMHG | HEART RATE: 102 BPM | TEMPERATURE: 97 F | HEIGHT: 67 IN

## 2024-06-18 DIAGNOSIS — Z79.4 TYPE 2 DIABETES MELLITUS WITHOUT COMPLICATION, WITH LONG-TERM CURRENT USE OF INSULIN (MULTI): ICD-10-CM

## 2024-06-18 DIAGNOSIS — E11.9 TYPE 2 DIABETES MELLITUS WITHOUT COMPLICATION, WITH LONG-TERM CURRENT USE OF INSULIN (MULTI): ICD-10-CM

## 2024-06-18 LAB
ALBUMIN SERPL BCP-MCNC: 4.4 G/DL (ref 3.4–5)
ALP SERPL-CCNC: 69 U/L (ref 33–120)
ALT SERPL W P-5'-P-CCNC: 23 U/L (ref 10–52)
ANION GAP SERPL CALC-SCNC: 14 MMOL/L (ref 10–20)
AST SERPL W P-5'-P-CCNC: 44 U/L (ref 9–39)
BILIRUB SERPL-MCNC: 0.8 MG/DL (ref 0–1.2)
BUN SERPL-MCNC: 10 MG/DL (ref 6–23)
CALCIUM SERPL-MCNC: 9.7 MG/DL (ref 8.6–10.6)
CHLORIDE SERPL-SCNC: 100 MMOL/L (ref 98–107)
CHOLEST SERPL-MCNC: 140 MG/DL (ref 0–199)
CHOLESTEROL/HDL RATIO: 2.3
CO2 SERPL-SCNC: 25 MMOL/L (ref 21–32)
CREAT SERPL-MCNC: 0.84 MG/DL (ref 0.5–1.3)
CREAT UR-MCNC: 144.8 MG/DL (ref 20–370)
EGFRCR SERPLBLD CKD-EPI 2021: >90 ML/MIN/1.73M*2
GLUCOSE BLD MANUAL STRIP-MCNC: 177 MG/DL (ref 74–99)
GLUCOSE SERPL-MCNC: 156 MG/DL (ref 74–99)
HDLC SERPL-MCNC: 60.1 MG/DL
LDLC SERPL CALC-MCNC: 72 MG/DL
MICROALBUMIN UR-MCNC: 20.5 MG/L
MICROALBUMIN/CREAT UR: 14.2 UG/MG CREAT
NON HDL CHOLESTEROL: 80 MG/DL (ref 0–149)
POTASSIUM SERPL-SCNC: 4.4 MMOL/L (ref 3.5–5.3)
PROT SERPL-MCNC: 7.8 G/DL (ref 6.4–8.2)
SODIUM SERPL-SCNC: 135 MMOL/L (ref 136–145)
TRIGL SERPL-MCNC: 40 MG/DL (ref 0–149)
TSH SERPL-ACNC: 1.22 MIU/L (ref 0.44–3.98)
VLDL: 8 MG/DL (ref 0–40)

## 2024-06-18 PROCEDURE — 84443 ASSAY THYROID STIM HORMONE: CPT | Performed by: STUDENT IN AN ORGANIZED HEALTH CARE EDUCATION/TRAINING PROGRAM

## 2024-06-18 PROCEDURE — 3074F SYST BP LT 130 MM HG: CPT | Performed by: STUDENT IN AN ORGANIZED HEALTH CARE EDUCATION/TRAINING PROGRAM

## 2024-06-18 PROCEDURE — 3080F DIAST BP >= 90 MM HG: CPT | Performed by: STUDENT IN AN ORGANIZED HEALTH CARE EDUCATION/TRAINING PROGRAM

## 2024-06-18 PROCEDURE — 99204 OFFICE O/P NEW MOD 45 MIN: CPT | Performed by: STUDENT IN AN ORGANIZED HEALTH CARE EDUCATION/TRAINING PROGRAM

## 2024-06-18 PROCEDURE — 3046F HEMOGLOBIN A1C LEVEL >9.0%: CPT | Performed by: STUDENT IN AN ORGANIZED HEALTH CARE EDUCATION/TRAINING PROGRAM

## 2024-06-18 PROCEDURE — 80053 COMPREHEN METABOLIC PANEL: CPT | Performed by: STUDENT IN AN ORGANIZED HEALTH CARE EDUCATION/TRAINING PROGRAM

## 2024-06-18 PROCEDURE — 4010F ACE/ARB THERAPY RXD/TAKEN: CPT | Performed by: STUDENT IN AN ORGANIZED HEALTH CARE EDUCATION/TRAINING PROGRAM

## 2024-06-18 PROCEDURE — 82570 ASSAY OF URINE CREATININE: CPT | Performed by: STUDENT IN AN ORGANIZED HEALTH CARE EDUCATION/TRAINING PROGRAM

## 2024-06-18 PROCEDURE — 36415 COLL VENOUS BLD VENIPUNCTURE: CPT | Performed by: STUDENT IN AN ORGANIZED HEALTH CARE EDUCATION/TRAINING PROGRAM

## 2024-06-18 PROCEDURE — 1036F TOBACCO NON-USER: CPT | Performed by: STUDENT IN AN ORGANIZED HEALTH CARE EDUCATION/TRAINING PROGRAM

## 2024-06-18 PROCEDURE — 99214 OFFICE O/P EST MOD 30 MIN: CPT | Performed by: STUDENT IN AN ORGANIZED HEALTH CARE EDUCATION/TRAINING PROGRAM

## 2024-06-18 PROCEDURE — 82947 ASSAY GLUCOSE BLOOD QUANT: CPT | Performed by: STUDENT IN AN ORGANIZED HEALTH CARE EDUCATION/TRAINING PROGRAM

## 2024-06-18 PROCEDURE — 80061 LIPID PANEL: CPT | Performed by: STUDENT IN AN ORGANIZED HEALTH CARE EDUCATION/TRAINING PROGRAM

## 2024-06-18 RX ORDER — GLIPIZIDE 5 MG/1
5 TABLET, FILM COATED, EXTENDED RELEASE ORAL DAILY
Qty: 90 TABLET | Refills: 3 | Status: SHIPPED | OUTPATIENT
Start: 2024-06-18 | End: 2025-06-18

## 2024-06-18 ASSESSMENT — ENCOUNTER SYMPTOMS
DEPRESSION: 0
LOSS OF SENSATION IN FEET: 1
OCCASIONAL FEELINGS OF UNSTEADINESS: 0

## 2024-06-18 ASSESSMENT — PATIENT HEALTH QUESTIONNAIRE - PHQ9
SUM OF ALL RESPONSES TO PHQ9 QUESTIONS 1 AND 2: 0
1. LITTLE INTEREST OR PLEASURE IN DOING THINGS: NOT AT ALL
2. FEELING DOWN, DEPRESSED OR HOPELESS: NOT AT ALL

## 2024-06-18 ASSESSMENT — PAIN SCALES - GENERAL: PAINLEVEL: 4

## 2024-06-18 NOTE — PROGRESS NOTES
Patient is sent at the request of Gene Lucero DO for my opinion regarding Type 2 diabetes.  My final recommendations will be communicated back to the requesting provider by way of shared medical record.    Subjective   Belén Menon is a 58 y.o. male who presents for initial visit for evaluation of Type 2 diabetes mellitus. The initial diagnosis of diabetes was made over a year ago.   Lab Results   Component Value Date    HGBA1C 19.4 (H) 05/04/2024      PMH of HTN, DM2, HLD.   HTN: Amlodipine 10mg, lisinopril 40mg  HLD: Atorvastatin 40mg  DM2: Jardiance 25mg, lantus 25U nightly   When Dx above a year ago was started on Metformin no side effects but prefers not to take it.  Interval hx: Was recently discharged from hospital, most recent A1C was 19.4. Started on Jardiance. Per notes after discharge he was restarted on lantus 20 units and increased to 25 units on 5/22.  Takes it every night but did not take it the last 2 nights ( forgot it)  The patient is currently checking the blood glucose 4-5 times per day.  Patient is using: continuous glucose monitor  In am: 75-  Throughout the day goes up to 215-244 but improves  Today 177 after donut at 6 am few hours ago  Hypoglycemia frequency: No  Hypoglycemia awareness: No     Diet:  Breakfast: Gritz, eggs sausage and wheat toast  Lunch: Varies. Eats lunch fast food   Dinner: Varies. Meat loaf mac and cheese and mixed vegetables  Snack: Fruit watermelon, strawberries, pineapple   Beverages: No POP occasional juice. Changed everything to water  Weight: Lost around 80-90 lbs in the last year    Known complications due to diabetes included peripheral neuropathy    Cardiovascular risk factors include advanced age (older than 55 for men, 65 for women), diabetes mellitus, dyslipidemia, hypertension, and male gender. The patient is on an ACE inhibitor or angiotensin II receptor blocker.      Foot Exam: Tingling in his feet 3 weeks ago. Was seen in the wellness center  "  Eye Exam: Not recent  Lipid Panel: On atorvastatin   Urine Albumin: Not recent     The patient has not been previously hospitalized due to diabetic ketoacidosis.     Current symptoms/problems include paresthesia of the feet. Her clinical course has improved.     Review of Systems  all pertinent systems reviewed and are otherwise negative   Objective   Ht 1.702 m (5' 7\")   Wt 65.8 kg (145 lb)   BMI 22.71 kg/m²   Physical Exam  Constitutional:       General: He is not in acute distress.     Appearance: Normal appearance.   Eyes:      Extraocular Movements: Extraocular movements intact.      Pupils: Pupils are equal, round, and reactive to light.   Cardiovascular:      Rate and Rhythm: Normal rate and regular rhythm.   Pulmonary:      Effort: Pulmonary effort is normal. No respiratory distress.      Breath sounds: Normal breath sounds.   Abdominal:      General: Bowel sounds are normal.      Palpations: Abdomen is soft.      Tenderness: There is no abdominal tenderness.   Skin:     Coloration: Skin is not jaundiced or pale.      Findings: No erythema or rash.   Neurological:      General: No focal deficit present.      Mental Status: He is alert and oriented to person, place, and time.      Deep Tendon Reflexes: Reflexes normal.   Psychiatric:         Mood and Affect: Mood normal.         Behavior: Behavior normal.         Lab Review  Glucose (mg/dL)   Date Value   04/24/2022 473 (HH)   08/18/2020 169 (H)   08/17/2020 275 (H)     Hemoglobin A1C (%)   Date Value   05/04/2024 19.4 (H)   04/12/2022 11.9 (A)   03/09/2021 6.7   07/27/2020 12.7     Bicarbonate (mmol/L)   Date Value   04/24/2022 21   08/18/2020 26   08/17/2020 26     Urea Nitrogen (mg/dL)   Date Value   04/24/2022 16   08/18/2020 9   08/17/2020 14     Creatinine (mg/dL)   Date Value   04/24/2022 1.07   08/18/2020 1.01   08/17/2020 1.50 (H)     Lab Results   Component Value Date    CHOL 198 07/27/2020    CHOL 184 05/10/2019     Lab Results   Component " "Value Date    HDL 36.7 (A) 07/27/2020    HDL 46.5 05/10/2019     No results found for: \"LDLCALC\"  Lab Results   Component Value Date    TRIG 84 07/27/2020    TRIG 62 05/10/2019     No components found for: \"CHOLHDL\"   Lab Results   Component Value Date    TSH 0.54 07/27/2020       Assessment/Plan   Diagnoses and all orders for this visit:  Type 2 diabetes mellitus without complication, with long-term current use of insulin (Multi)  -     Referral to Endocrinology  Mr. Menon is a 58 y.o. male who presents for initial visit for evaluation of Type 2 diabetes mellitus. The initial diagnosis of diabetes was made over a year ago.   Most recent A1c 19.4% in May 2024  PMH of HTN, DM2, HLD.   HTN: Amlodipine 10mg, lisinopril 40mg  HLD: Atorvastatin 40mg  DM2: Jardiance 25mg, lantus 25U nightly   When Dx above a year ago was started on Metformin no side effects but prefers not to take it.  Was recently discharged from hospital, most recent A1C was 19.4. Started on Jardiance. Per notes after discharge he was restarted on lantus 20 units and increased to 25 units on 5/22.  Takes it every night but did not take it the last 2 nights ( forgot it)  Patient is using: continuous glucose monitor  In am: 75-, Throughout the day goes up to 215-244 but improves  Today 177 after donut at 6 am few hours ago  Non compliant to diet  Known complications due to diabetes included peripheral neuropathy  Cardiovascular risk factors include advanced age (older than 55 for men, 65 for women), diabetes mellitus, dyslipidemia, hypertension, and male gender. The patient is on an ACE inhibitor or angiotensin II receptor blocker.    Foot Exam: Tingling in his feet 3 weeks ago. Was seen in the wellness center   Eye Exam: Not recent  Lipid Panel: On atorvastatin   Urine Albumin: Not recent   Weight: Lost around 80-90 lbs in the last year  Plan:  Check BG 4-5 times a day using freestyle noemy  Continue Jardiance 25 mg once daily  Start glipizide 5 " mg once daily with lunch  Decrease Lantus to 15 units daily and monitor morning sugars.  If morning blood sugars less than 80 decrease Lantus by 5 units every 2 days.  Information about healthy diet in diabetics was given to patient and plate method was discussed  Ophthalmology referral  Continue lisinopril and atorvastatin    Blood work and urine today  Return to clinic in 3 months  Problem List Items Addressed This Visit       Diabetes mellitus, type 2 (Multi)    Relevant Medications    glipiZIDE XL (Glucotrol XL) 5 mg 24 hr tablet    Other Relevant Orders    Albumin , Urine Random    Lipid Panel    TSH with reflex to Free T4 if abnormal    Referral to Ophthalmology    Comprehensive Metabolic Panel

## 2024-06-18 NOTE — PATIENT INSTRUCTIONS
Regarding her diabetes:  Please reattach the freestyle noemy and make sure to check your sugars 4-5 times a day  Continue Jardiance 25 mg once daily  Start glipizide 5 mg once daily with lunch  Decrease Lantus to 15 units daily and monitor morning sugars.  If morning blood sugars less than 80 decrease Lantus by 5 units every 2 days.  Please adjust her diet try to eat healthier and avoid fried food  Ophthalmology referral  Continue lisinopril and atorvastatin    Blood work and urine today  Return to clinic in 3 months

## 2024-06-19 ENCOUNTER — APPOINTMENT (OUTPATIENT)
Dept: CARE COORDINATION | Facility: CLINIC | Age: 59
End: 2024-06-19
Payer: COMMERCIAL

## 2024-06-20 ENCOUNTER — CLINICAL SUPPORT (OUTPATIENT)
Dept: CARE COORDINATION | Facility: CLINIC | Age: 59
End: 2024-06-20
Payer: COMMERCIAL

## 2024-06-20 NOTE — PROGRESS NOTES
"Patient scheduled today for initial DSME. Patient reports he did receive T2-pdf handout but has not reviewed.  Today he shared his journey to diagnosis with A!c of 19% at time of diagnosis.  He has lost a significant amount of weight this year, he reports 75-90 lbs; current weight per patient 148lbs, would like to regain to 175-180lbs, he is 5' 8\". Currently taking Jardience, HS Lantus now at 20 units and glyburide with lunch.  He uses a Elroy CGM and checks a couple times a day and usually in the 120-150 range.  He saw endo yesterday and reports he was given a \"food chart\"with instruction on what to eat and BG goals; he could not recall a recommended carb intake/meal.  Diet recall:  Breakfast:  Grits or oatmeal, eggs, toast, ortega Lunch: often fast food but trying to choose baked items such as baked fish, yesterday had meatloaf, macaroni and mixed vegetables. Dinner:  highly variable but trying to eat per food chart.  He reports his BS is improving overall, he has reports some tingling in his feet and has ordered compression socks and purchase lotion from a \"farmer\" for neuropathy.  I invited him to accept [employer] View invite to assist him with interrupting his Elroy Summary report; he did agree and this invite was sent today.   Patient had another appointment at 9:30 so we ended our session approximately 9:20, therefore unable to complete full assessment or review of material.  He agreed to a follow-up phone call; he requested next week.  Will call him in next 7-10 days to continue education and or answer any questions he may have; will also review Elroy Summary Report if available.   "

## 2024-07-11 ENCOUNTER — APPOINTMENT (OUTPATIENT)
Dept: PRIMARY CARE | Facility: CLINIC | Age: 59
End: 2024-07-11
Payer: COMMERCIAL

## 2024-07-11 ENCOUNTER — OFFICE VISIT (OUTPATIENT)
Dept: PRIMARY CARE | Facility: CLINIC | Age: 59
End: 2024-07-11
Payer: COMMERCIAL

## 2024-07-11 VITALS
SYSTOLIC BLOOD PRESSURE: 111 MMHG | HEIGHT: 68 IN | WEIGHT: 140 LBS | BODY MASS INDEX: 21.22 KG/M2 | DIASTOLIC BLOOD PRESSURE: 70 MMHG

## 2024-07-11 DIAGNOSIS — Z79.4 TYPE 2 DIABETES MELLITUS WITH DIABETIC POLYNEUROPATHY, WITH LONG-TERM CURRENT USE OF INSULIN (MULTI): Primary | ICD-10-CM

## 2024-07-11 DIAGNOSIS — E11.9 TYPE 2 DIABETES MELLITUS WITHOUT COMPLICATION, UNSPECIFIED WHETHER LONG TERM INSULIN USE (MULTI): ICD-10-CM

## 2024-07-11 DIAGNOSIS — Z12.11 SCREEN FOR COLON CANCER: ICD-10-CM

## 2024-07-11 DIAGNOSIS — E11.42 TYPE 2 DIABETES MELLITUS WITH DIABETIC POLYNEUROPATHY, WITH LONG-TERM CURRENT USE OF INSULIN (MULTI): Primary | ICD-10-CM

## 2024-07-11 LAB — POC FINGERSTICK BLOOD GLUCOSE: 89 MG/DL (ref 70–100)

## 2024-07-11 PROCEDURE — 99214 OFFICE O/P EST MOD 30 MIN: CPT

## 2024-07-11 PROCEDURE — 82962 GLUCOSE BLOOD TEST: CPT

## 2024-07-11 PROCEDURE — 3061F NEG MICROALBUMINURIA REV: CPT

## 2024-07-11 PROCEDURE — 3078F DIAST BP <80 MM HG: CPT

## 2024-07-11 PROCEDURE — 3046F HEMOGLOBIN A1C LEVEL >9.0%: CPT

## 2024-07-11 PROCEDURE — 3048F LDL-C <100 MG/DL: CPT

## 2024-07-11 PROCEDURE — 4010F ACE/ARB THERAPY RXD/TAKEN: CPT

## 2024-07-11 PROCEDURE — 3074F SYST BP LT 130 MM HG: CPT

## 2024-07-11 RX ORDER — BLOOD-GLUCOSE SENSOR
EACH MISCELLANEOUS
Qty: 2 EACH | Refills: 11 | Status: SHIPPED | OUTPATIENT
Start: 2024-07-11

## 2024-07-11 RX ORDER — AMITRIPTYLINE HYDROCHLORIDE 25 MG/1
25 TABLET, FILM COATED ORAL NIGHTLY
Qty: 30 TABLET | Refills: 2 | Status: SHIPPED | OUTPATIENT
Start: 2024-07-11 | End: 2024-10-09

## 2024-07-11 ASSESSMENT — PATIENT HEALTH QUESTIONNAIRE - PHQ9
2. FEELING DOWN, DEPRESSED OR HOPELESS: NOT AT ALL
SUM OF ALL RESPONSES TO PHQ9 QUESTIONS 1 AND 2: 0
1. LITTLE INTEREST OR PLEASURE IN DOING THINGS: NOT AT ALL

## 2024-07-11 ASSESSMENT — LIFESTYLE VARIABLES
SKIP TO QUESTIONS 9-10: 1
HOW OFTEN DO YOU HAVE A DRINK CONTAINING ALCOHOL: 2-3 TIMES A WEEK
HOW MANY STANDARD DRINKS CONTAINING ALCOHOL DO YOU HAVE ON A TYPICAL DAY: 1 OR 2
AUDIT-C TOTAL SCORE: 3
HOW OFTEN DO YOU HAVE SIX OR MORE DRINKS ON ONE OCCASION: NEVER

## 2024-07-11 NOTE — PROGRESS NOTES
"Subjective   Patient ID: Belén Menon is a 58 y.o. male who presents for neuopathy issues.  HPI  58 year old male with PMH of HTN, DM2, HLD.   HTN: Amlodipine 10mg, lisinopril 40mg  HLD: Atorvastatin 40mg  DM2: Jardiance 25mg, lantus 15U nightly, glipizide 5mg   Neuropathy:  Amitriptyline 25mg    Reports sugars have been well controlled, struggling with neuropathy in feet. A friend is taking amitriptyline, wants to start it as well.     All systems have been reviewed and are negative for complaint other than those mentioned in the HPI.     Objective   /70   Ht 1.727 m (5' 8\")   Wt 63.5 kg (140 lb)   BMI 21.29 kg/m²    Physical Exam  Constitutional:       General: He is awake.      Appearance: Normal appearance.   HENT:      Head: Normocephalic and atraumatic.   Eyes:      Extraocular Movements: Extraocular movements intact.      Pupils: Pupils are equal, round, and reactive to light.   Cardiovascular:      Rate and Rhythm: Normal rate and regular rhythm.      Heart sounds: S1 normal and S2 normal. No murmur heard.  Pulmonary:      Effort: Pulmonary effort is normal.      Breath sounds: Normal breath sounds.   Musculoskeletal:      Cervical back: Normal range of motion and neck supple.      Right lower leg: No edema.      Left lower leg: No edema.   Skin:     General: Skin is warm and dry.   Neurological:      General: No focal deficit present.      Mental Status: He is alert and oriented to person, place, and time.   Psychiatric:         Mood and Affect: Mood and affect normal.         Behavior: Behavior normal. Behavior is cooperative.         Thought Content: Thought content normal.         Judgment: Judgment normal.     Belén was seen today for neuopathy issues.  Diagnoses and all orders for this visit:  Type 2 diabetes mellitus with diabetic polyneuropathy, with long-term current use of insulin (Multi) (Primary)  -     Was having difficulty with freestyle noemy 3 sensors, will send refill to " pharmacy, patient can come to this office for instruction   - Start amitriptyline for neuropathy   - Do recommend patient see podiatry for nail clipping given extent of neuropathy. Would also appreciate pain medicine's input given extent and severity of symptoms. Denies back or leg pain.   - amitriptyline (Elavil) 25 mg tablet; Take 1 tablet (25 mg) by mouth once daily at bedtime.  -     Referral to Pain Medicine; Future  -     Referral to Podiatry; Future  -     POCT Fingerstick Glucose manually resulted  -     blood-glucose sensor (FreeStyle Elroy 3 Sensor) device; Use to check sugars. Change sensor every 14 days  Screen for colon cancer  -     Due for colonoscopy, patient to schedule   - Colonoscopy Screening; High Risk Patient; Future    Follow up in 1 month.

## 2024-07-12 ENCOUNTER — TELEPHONE (OUTPATIENT)
Dept: GASTROENTEROLOGY | Facility: HOSPITAL | Age: 59
End: 2024-07-12
Payer: COMMERCIAL

## 2024-07-12 DIAGNOSIS — Z12.11 COLON CANCER SCREENING: Primary | ICD-10-CM

## 2024-07-12 RX ORDER — POLYETHYLENE GLYCOL 3350, SODIUM CHLORIDE, SODIUM BICARBONATE, POTASSIUM CHLORIDE 420; 11.2; 5.72; 1.48 G/4L; G/4L; G/4L; G/4L
4000 POWDER, FOR SOLUTION ORAL ONCE
Qty: 4000 ML | Refills: 0 | OUTPATIENT
Start: 2024-07-12 | End: 2024-07-19

## 2024-07-17 ENCOUNTER — TELEPHONE (OUTPATIENT)
Dept: GASTROENTEROLOGY | Facility: HOSPITAL | Age: 59
End: 2024-07-17
Payer: COMMERCIAL

## 2024-07-17 DIAGNOSIS — Z12.11 COLON CANCER SCREENING: Primary | ICD-10-CM

## 2024-07-17 RX ORDER — POLYETHYLENE GLYCOL-3350 AND ELECTROLYTES 236; 6.74; 5.86; 2.97; 22.74 G/274.31G; G/274.31G; G/274.31G; G/274.31G; G/274.31G
4000 POWDER, FOR SOLUTION ORAL ONCE
Qty: 4000 ML | Refills: 0 | OUTPATIENT
Start: 2024-07-17 | End: 2024-07-19

## 2024-07-17 NOTE — TELEPHONE ENCOUNTER
----- Message from Vivi DUARTE sent at 7/12/2024 11:47 AM EDT -----  Regarding: Golytely Bowel Prep Med Request  Hello Dr. Sanchez,       Can you please place an order for Golytely bowel prep to the patient's preferred pharmacy?  He is scheduled on 7-8-24, and [phone].      Thank you,  Vivi

## 2024-07-18 ENCOUNTER — APPOINTMENT (OUTPATIENT)
Dept: GASTROENTEROLOGY | Facility: HOSPITAL | Age: 59
End: 2024-07-18
Payer: COMMERCIAL

## 2024-07-19 ENCOUNTER — HOSPITAL ENCOUNTER (OUTPATIENT)
Dept: GASTROENTEROLOGY | Facility: HOSPITAL | Age: 59
Discharge: HOME | End: 2024-07-19
Payer: COMMERCIAL

## 2024-07-19 VITALS
OXYGEN SATURATION: 100 % | RESPIRATION RATE: 19 BRPM | HEIGHT: 68 IN | TEMPERATURE: 97.9 F | WEIGHT: 150 LBS | BODY MASS INDEX: 22.73 KG/M2 | HEART RATE: 105 BPM | DIASTOLIC BLOOD PRESSURE: 104 MMHG | SYSTOLIC BLOOD PRESSURE: 127 MMHG

## 2024-07-19 DIAGNOSIS — D3A.026: Primary | ICD-10-CM

## 2024-07-19 DIAGNOSIS — Z12.11 SCREEN FOR COLON CANCER: ICD-10-CM

## 2024-07-19 LAB — GLUCOSE BLD MANUAL STRIP-MCNC: 153 MG/DL (ref 74–99)

## 2024-07-19 PROCEDURE — 45378 DIAGNOSTIC COLONOSCOPY: CPT | Mod: 52 | Performed by: STUDENT IN AN ORGANIZED HEALTH CARE EDUCATION/TRAINING PROGRAM

## 2024-07-19 PROCEDURE — 82947 ASSAY GLUCOSE BLOOD QUANT: CPT

## 2024-07-19 PROCEDURE — 7100000009 HC PHASE TWO TIME - INITIAL BASE CHARGE

## 2024-07-19 PROCEDURE — 7100000010 HC PHASE TWO TIME - EACH INCREMENTAL 1 MINUTE

## 2024-07-19 PROCEDURE — 2500000004 HC RX 250 GENERAL PHARMACY W/ HCPCS (ALT 636 FOR OP/ED): Mod: SE | Performed by: STUDENT IN AN ORGANIZED HEALTH CARE EDUCATION/TRAINING PROGRAM

## 2024-07-19 RX ORDER — POLYETHYLENE GLYCOL 3350, SODIUM CHLORIDE, SODIUM BICARBONATE, POTASSIUM CHLORIDE 420; 11.2; 5.72; 1.48 G/4L; G/4L; G/4L; G/4L
POWDER, FOR SOLUTION ORAL
Qty: 6000 ML | Refills: 0 | Status: SHIPPED | OUTPATIENT
Start: 2024-07-19

## 2024-07-19 RX ORDER — FENTANYL CITRATE 50 UG/ML
INJECTION, SOLUTION INTRAMUSCULAR; INTRAVENOUS AS NEEDED
Status: COMPLETED | OUTPATIENT
Start: 2024-07-19 | End: 2024-07-19

## 2024-07-19 RX ORDER — MIDAZOLAM HYDROCHLORIDE 1 MG/ML
INJECTION, SOLUTION INTRAMUSCULAR; INTRAVENOUS AS NEEDED
Status: COMPLETED | OUTPATIENT
Start: 2024-07-19 | End: 2024-07-19

## 2024-07-19 ASSESSMENT — PAIN - FUNCTIONAL ASSESSMENT
PAIN_FUNCTIONAL_ASSESSMENT: 0-10

## 2024-07-19 ASSESSMENT — COLUMBIA-SUICIDE SEVERITY RATING SCALE - C-SSRS
6. HAVE YOU EVER DONE ANYTHING, STARTED TO DO ANYTHING, OR PREPARED TO DO ANYTHING TO END YOUR LIFE?: NO
2. HAVE YOU ACTUALLY HAD ANY THOUGHTS OF KILLING YOURSELF?: NO
1. IN THE PAST MONTH, HAVE YOU WISHED YOU WERE DEAD OR WISHED YOU COULD GO TO SLEEP AND NOT WAKE UP?: NO

## 2024-07-19 ASSESSMENT — PAIN SCALES - GENERAL
PAINLEVEL_OUTOF10: 0 - NO PAIN
PAINLEVEL_OUTOF10: 5 - MODERATE PAIN
PAINLEVEL_OUTOF10: 0 - NO PAIN
PAINLEVEL_OUTOF10: 0 - NO PAIN
PAINLEVEL_OUTOF10: 6
PAINLEVEL_OUTOF10: 0 - NO PAIN
PAINLEVEL_OUTOF10: 5 - MODERATE PAIN
PAINLEVEL_OUTOF10: 0 - NO PAIN
PAINLEVEL_OUTOF10: 0 - NO PAIN

## 2024-07-19 NOTE — H&P
"History Of Present Illness  Belén Menon is a 58 y.o. male presenting with PMH of HTN and T2DM, who presents for colonoscopy due to history of colon polyp with path showing well differentiated neuroendocrine tumor.     Past Medical History  Past Medical History:   Diagnosis Date    Essential (primary) hypertension 07/27/2020    Benign hypertension    Other conditions influencing health status 05/20/2020    Diabetes mellitus type 2, uncontrolled     Surgical History  History reviewed. No pertinent surgical history.  Social History  He reports that he has never smoked. He has never been exposed to tobacco smoke. He has never used smokeless tobacco. He reports current alcohol use. He reports that he does not currently use drugs after having used the following drugs: Marijuana.    Family History  Family History   Problem Relation Name Age of Onset    Diabetes Other      Hypertension Other          Allergies  No Known Allergies  Review of Systems  Pre-sedation Evaluation:  ASA Classification - ASA 2 - Patient with mild systemic disease with no functional limitations  Mallampati Score - II (hard and soft palate, upper portion of tonsils and uvula visible)    Physical Exam  Constitutional:       General: He is not in acute distress.     Appearance: Normal appearance.   HENT:      Head: Normocephalic and atraumatic.   Eyes:      Extraocular Movements: Extraocular movements intact.      Conjunctiva/sclera: Conjunctivae normal.   Pulmonary:      Effort: Pulmonary effort is normal. No respiratory distress.   Abdominal:      General: There is no distension.      Palpations: Abdomen is soft.   Neurological:      Mental Status: He is alert and oriented to person, place, and time.          Last Recorded Vitals  Blood pressure (!) 148/110, pulse (!) 111, temperature 36.6 °C (97.9 °F), temperature source Temporal, resp. rate 18, height 1.727 m (5' 8\"), weight 68 kg (150 lb), SpO2 100%.     Assessment/Plan   Belén Menon is a " 58 y.o. male presenting with PMH of HTN and T2DM, who presents for colonoscopy due to history of colon polyp with path showing well differentiated neuroendocrine tumor.        PTA/Current Medications:  (Not in a hospital admission)    Current Outpatient Medications   Medication Sig Dispense Refill    amitriptyline (Elavil) 25 mg tablet Take 1 tablet (25 mg) by mouth once daily at bedtime. 30 tablet 2    amLODIPine (Norvasc) 10 mg tablet Take 1 tablet (10 mg) by mouth once daily. 30 tablet 11    atorvastatin (Lipitor) 40 mg tablet Take 1 tablet (40 mg) by mouth once daily. 30 tablet 11    empagliflozin (Jardiance) 25 mg Take 1 tablet (25 mg) by mouth once daily. 30 tablet 11    glipiZIDE XL (Glucotrol XL) 5 mg 24 hr tablet Take 1 tablet (5 mg) by mouth once daily. Do not crush, chew, or split. 90 tablet 3    insulin glargine (Lantus Solostar U-100 Insulin) 100 unit/mL (3 mL) pen Inject 25 Units under the skin once daily at bedtime. 15 mL 2    lisinopril 40 mg tablet Take 1 tablet (40 mg) by mouth once daily. 30 tablet 11    blood pressure monitor kit Use as directed      blood-glucose meter,continuous (FreeStyle Elroy 3 [place]) [facility] Use as instructed 1 each 0    blood-glucose sensor (FreeStyle Elroy 3 Sensor) device Use to check sugars. Change sensor every 14 days 2 each 11     No current facility-administered medications for this encounter.     Everette Flor MD

## 2024-07-22 ENCOUNTER — HOSPITAL ENCOUNTER (OUTPATIENT)
Dept: GASTROENTEROLOGY | Facility: HOSPITAL | Age: 59
Discharge: HOME | End: 2024-07-22
Payer: COMMERCIAL

## 2024-07-22 VITALS
HEIGHT: 68 IN | RESPIRATION RATE: 20 BRPM | OXYGEN SATURATION: 100 % | WEIGHT: 150 LBS | TEMPERATURE: 97.3 F | DIASTOLIC BLOOD PRESSURE: 98 MMHG | SYSTOLIC BLOOD PRESSURE: 126 MMHG | BODY MASS INDEX: 22.73 KG/M2 | HEART RATE: 86 BPM

## 2024-07-22 DIAGNOSIS — D3A.026: ICD-10-CM

## 2024-07-22 LAB
GLUCOSE BLD MANUAL STRIP-MCNC: 130 MG/DL (ref 74–99)
GLUCOSE BLD MANUAL STRIP-MCNC: 52 MG/DL (ref 74–99)

## 2024-07-22 PROCEDURE — 82947 ASSAY GLUCOSE BLOOD QUANT: CPT

## 2024-07-22 PROCEDURE — 99152 MOD SED SAME PHYS/QHP 5/>YRS: CPT | Performed by: INTERNAL MEDICINE

## 2024-07-22 PROCEDURE — 7100000010 HC PHASE TWO TIME - EACH INCREMENTAL 1 MINUTE

## 2024-07-22 PROCEDURE — 3700000012 HC SEDATION LEVEL 5+ TIME - INITIAL 15 MINUTES 5/> YEARS

## 2024-07-22 PROCEDURE — 7100000009 HC PHASE TWO TIME - INITIAL BASE CHARGE

## 2024-07-22 PROCEDURE — 2500000004 HC RX 250 GENERAL PHARMACY W/ HCPCS (ALT 636 FOR OP/ED): Mod: SE | Performed by: INTERNAL MEDICINE

## 2024-07-22 PROCEDURE — 3700000013 HC SEDATION LEVEL 5+ TIME - EACH ADDITIONAL 15 MINUTES

## 2024-07-22 PROCEDURE — 99153 MOD SED SAME PHYS/QHP EA: CPT | Performed by: INTERNAL MEDICINE

## 2024-07-22 PROCEDURE — 2500000005 HC RX 250 GENERAL PHARMACY W/O HCPCS: Mod: SE | Performed by: INTERNAL MEDICINE

## 2024-07-22 PROCEDURE — 45378 DIAGNOSTIC COLONOSCOPY: CPT | Performed by: INTERNAL MEDICINE

## 2024-07-22 RX ORDER — FENTANYL CITRATE 50 UG/ML
INJECTION, SOLUTION INTRAMUSCULAR; INTRAVENOUS AS NEEDED
Status: COMPLETED | OUTPATIENT
Start: 2024-07-22 | End: 2024-07-22

## 2024-07-22 RX ORDER — SODIUM CHLORIDE, SODIUM LACTATE, POTASSIUM CHLORIDE, CALCIUM CHLORIDE 600; 310; 30; 20 MG/100ML; MG/100ML; MG/100ML; MG/100ML
20 INJECTION, SOLUTION INTRAVENOUS CONTINUOUS
Status: DISCONTINUED | OUTPATIENT
Start: 2024-07-22 | End: 2024-07-23 | Stop reason: HOSPADM

## 2024-07-22 RX ORDER — MIDAZOLAM HYDROCHLORIDE 1 MG/ML
INJECTION, SOLUTION INTRAMUSCULAR; INTRAVENOUS AS NEEDED
Status: COMPLETED | OUTPATIENT
Start: 2024-07-22 | End: 2024-07-22

## 2024-07-22 RX ORDER — DEXTROSE 50 % IN WATER (D50W) INTRAVENOUS SYRINGE
25
Status: DISCONTINUED | OUTPATIENT
Start: 2024-07-22 | End: 2024-07-23 | Stop reason: HOSPADM

## 2024-07-22 ASSESSMENT — PAIN SCALES - GENERAL
PAINLEVEL_OUTOF10: 0 - NO PAIN
PAINLEVEL_OUTOF10: 5 - MODERATE PAIN
PAINLEVEL_OUTOF10: 5 - MODERATE PAIN
PAINLEVEL_OUTOF10: 0 - NO PAIN
PAINLEVEL_OUTOF10: 4

## 2024-07-22 ASSESSMENT — COLUMBIA-SUICIDE SEVERITY RATING SCALE - C-SSRS
2. HAVE YOU ACTUALLY HAD ANY THOUGHTS OF KILLING YOURSELF?: NO
1. IN THE PAST MONTH, HAVE YOU WISHED YOU WERE DEAD OR WISHED YOU COULD GO TO SLEEP AND NOT WAKE UP?: NO
6. HAVE YOU EVER DONE ANYTHING, STARTED TO DO ANYTHING, OR PREPARED TO DO ANYTHING TO END YOUR LIFE?: NO

## 2024-07-22 ASSESSMENT — ENCOUNTER SYMPTOMS
ABDOMINAL PAIN: 0
SHORTNESS OF BREATH: 0

## 2024-07-22 NOTE — DISCHARGE INSTRUCTIONS
During the first 24 hours after your procedure, you should:    - Resume normal diet, unless otherwise directed by your doctor.  - Resume your home medications, unless otherwise directed by your doctor.  - Refrain from driving or operative heavy machinery.  - Drink plenty of liquids.  - Avoid consuming alcohol.  - Avoid strenuous activity or heavy lifting.    After 24 hours, you can resume regular activity.    Call your doctor office immediately ([phone]) or come to the nearest emergency room if you experience:    - Abdominal tenderness  - Blood in your stool or vomit  - Difficulty urinating or passing stools  - Difficulty breathing  - Chest pain  - Fever

## 2024-07-22 NOTE — H&P
History Of Present Illness  Belén Menon is a 58 y.o. male PMH of HTN and T2DM, who presents for surveillance colonoscopy due to history of colon polyp with path showing well differentiated neuroendocrine tumor in 2017. Colonoscopy attempted 7/19/24 but was aborted due to poor prep. Pt reports eating chicken and pasta yesterday at 17:30. Afterwards pt drank 2L of bowel prep and had >10BMs. Last BM was this AM which was clear.     Past Medical History  Past Medical History:   Diagnosis Date    Essential (primary) hypertension 07/27/2020    Benign hypertension    Hyperlipidemia     Other conditions influencing health status 05/20/2020    Diabetes mellitus type 2, uncontrolled     Surgical History  History reviewed. No pertinent surgical history.  Social History  He reports that he has never smoked. He has never been exposed to tobacco smoke. He has never used smokeless tobacco. He reports current alcohol use. He reports that he does not currently use drugs after having used the following drugs: Marijuana.    Family History  Family History   Problem Relation Name Age of Onset    Diabetes Other      Hypertension Other          Allergies  No Known Allergies  Review of Systems   Respiratory:  Negative for shortness of breath.    Cardiovascular:  Negative for chest pain.   Gastrointestinal:  Negative for abdominal pain.     Pre-sedation Evaluation:  ASA Classification - ASA 2 - Patient with mild systemic disease with no functional limitations  Mallampati Score - II (hard and soft palate, upper portion of tonsils and uvula visible)    Physical Exam  HENT:      Head: Normocephalic and atraumatic.   Eyes:      Extraocular Movements: Extraocular movements intact.   Pulmonary:      Effort: Pulmonary effort is normal.   Abdominal:      Palpations: Abdomen is soft.   Skin:     General: Skin is warm.   Neurological:      General: No focal deficit present.      Mental Status: He is alert and oriented to person, place, and time.  "         Last Recorded Vitals  Blood pressure 114/90, pulse 91, temperature 36.3 °C (97.3 °F), temperature source Temporal, resp. rate 16, height 1.727 m (5' 8\"), weight 68 kg (150 lb), SpO2 100%.     Assessment/Plan   Proceed with colonoscopy.      PTA/Current Medications:  (Not in a hospital admission)    Current Outpatient Medications   Medication Sig Dispense Refill    amitriptyline (Elavil) 25 mg tablet Take 1 tablet (25 mg) by mouth once daily at bedtime. 30 tablet 2    amLODIPine (Norvasc) 10 mg tablet Take 1 tablet (10 mg) by mouth once daily. 30 tablet 11    atorvastatin (Lipitor) 40 mg tablet Take 1 tablet (40 mg) by mouth once daily. 30 tablet 11    empagliflozin (Jardiance) 25 mg Take 1 tablet (25 mg) by mouth once daily. 30 tablet 11    insulin glargine (Lantus Solostar U-100 Insulin) 100 unit/mL (3 mL) pen Inject 25 Units under the skin once daily at bedtime. 15 mL 2    lisinopril 40 mg tablet Take 1 tablet (40 mg) by mouth once daily. 30 tablet 11    polyethylene glycol-electrolytes (Nulytely) 420 gram solution Take 4,000mL by mouth at 5pm day before colonoscopy, and 2,000mL by mouth at 4am day of colonoscopy 6000 mL 0    blood pressure monitor kit Use as directed      blood-glucose meter,continuous (FreeStyle Elroy 3 [place]) [facility] [place] Use as instructed 1 each 0    blood-glucose sensor (FreeStyle Elroy 3 Sensor) device Use to check sugars. Change sensor every 14 days 2 each 11    glipiZIDE XL (Glucotrol XL) 5 mg 24 hr tablet Take 1 tablet (5 mg) by mouth once daily. Do not crush, chew, or split. (Patient not taking: Reported on 7/22/2024) 90 tablet 3     Current Facility-Administered Medications   Medication Dose Route Frequency Provider Last Rate Last Admin    dextrose 50 % injection 25 g  25 g intravenous q15 min PRN Ariel Mistry MD   25 g at 07/22/24 1512    lactated Ringer's infusion  20 mL/hr intravenous Continuous MD Rosalia Bermudez MD  "

## 2024-08-02 DIAGNOSIS — E11.42 TYPE 2 DIABETES MELLITUS WITH DIABETIC POLYNEUROPATHY, WITH LONG-TERM CURRENT USE OF INSULIN (MULTI): ICD-10-CM

## 2024-08-02 DIAGNOSIS — Z79.4 TYPE 2 DIABETES MELLITUS WITH DIABETIC POLYNEUROPATHY, WITH LONG-TERM CURRENT USE OF INSULIN (MULTI): ICD-10-CM

## 2024-08-02 RX ORDER — AMITRIPTYLINE HYDROCHLORIDE 25 MG/1
25 TABLET, FILM COATED ORAL NIGHTLY
Qty: 90 TABLET | Refills: 0 | Status: SHIPPED | OUTPATIENT
Start: 2024-08-02 | End: 2024-10-31

## 2024-08-12 ENCOUNTER — APPOINTMENT (OUTPATIENT)
Dept: PRIMARY CARE | Facility: CLINIC | Age: 59
End: 2024-08-12
Payer: COMMERCIAL

## 2024-08-12 ENCOUNTER — APPOINTMENT (OUTPATIENT)
Dept: PAIN MEDICINE | Facility: CLINIC | Age: 59
End: 2024-08-12
Payer: COMMERCIAL

## 2024-08-12 VITALS — WEIGHT: 136 LBS | SYSTOLIC BLOOD PRESSURE: 130 MMHG | DIASTOLIC BLOOD PRESSURE: 82 MMHG | BODY MASS INDEX: 20.68 KG/M2

## 2024-08-12 VITALS — DIASTOLIC BLOOD PRESSURE: 84 MMHG | RESPIRATION RATE: 17 BRPM | HEART RATE: 96 BPM | SYSTOLIC BLOOD PRESSURE: 115 MMHG

## 2024-08-12 DIAGNOSIS — E78.5 HYPERLIPIDEMIA, UNSPECIFIED HYPERLIPIDEMIA TYPE: ICD-10-CM

## 2024-08-12 DIAGNOSIS — E11.9 TYPE 2 DIABETES MELLITUS WITHOUT COMPLICATION, UNSPECIFIED WHETHER LONG TERM INSULIN USE (MULTI): ICD-10-CM

## 2024-08-12 DIAGNOSIS — I10 BENIGN ESSENTIAL HYPERTENSION: ICD-10-CM

## 2024-08-12 DIAGNOSIS — E11.42 TYPE 2 DIABETES MELLITUS WITH DIABETIC POLYNEUROPATHY, WITH LONG-TERM CURRENT USE OF INSULIN (MULTI): ICD-10-CM

## 2024-08-12 DIAGNOSIS — G63 POLYNEUROPATHY ASSOCIATED WITH UNDERLYING DISEASE (MULTI): Primary | ICD-10-CM

## 2024-08-12 DIAGNOSIS — E11.9 TYPE 2 DIABETES MELLITUS WITHOUT COMPLICATION, WITHOUT LONG-TERM CURRENT USE OF INSULIN (MULTI): ICD-10-CM

## 2024-08-12 DIAGNOSIS — Z79.4 TYPE 2 DIABETES MELLITUS WITH DIABETIC POLYNEUROPATHY, WITH LONG-TERM CURRENT USE OF INSULIN (MULTI): ICD-10-CM

## 2024-08-12 LAB — POC HEMOGLOBIN A1C: 8.3 % (ref 4.2–6.5)

## 2024-08-12 PROCEDURE — 99214 OFFICE O/P EST MOD 30 MIN: CPT

## 2024-08-12 PROCEDURE — 99204 OFFICE O/P NEW MOD 45 MIN: CPT | Performed by: PAIN MEDICINE

## 2024-08-12 PROCEDURE — 4010F ACE/ARB THERAPY RXD/TAKEN: CPT | Performed by: PAIN MEDICINE

## 2024-08-12 PROCEDURE — 3046F HEMOGLOBIN A1C LEVEL >9.0%: CPT

## 2024-08-12 PROCEDURE — 3075F SYST BP GE 130 - 139MM HG: CPT

## 2024-08-12 PROCEDURE — 3061F NEG MICROALBUMINURIA REV: CPT

## 2024-08-12 PROCEDURE — 3046F HEMOGLOBIN A1C LEVEL >9.0%: CPT | Performed by: PAIN MEDICINE

## 2024-08-12 PROCEDURE — 4010F ACE/ARB THERAPY RXD/TAKEN: CPT

## 2024-08-12 PROCEDURE — 3079F DIAST BP 80-89 MM HG: CPT | Performed by: PAIN MEDICINE

## 2024-08-12 PROCEDURE — 1036F TOBACCO NON-USER: CPT

## 2024-08-12 PROCEDURE — 1036F TOBACCO NON-USER: CPT | Performed by: PAIN MEDICINE

## 2024-08-12 PROCEDURE — 83036 HEMOGLOBIN GLYCOSYLATED A1C: CPT

## 2024-08-12 PROCEDURE — 3079F DIAST BP 80-89 MM HG: CPT

## 2024-08-12 PROCEDURE — 3074F SYST BP LT 130 MM HG: CPT | Performed by: PAIN MEDICINE

## 2024-08-12 PROCEDURE — 3048F LDL-C <100 MG/DL: CPT

## 2024-08-12 PROCEDURE — 3061F NEG MICROALBUMINURIA REV: CPT | Performed by: PAIN MEDICINE

## 2024-08-12 PROCEDURE — 3048F LDL-C <100 MG/DL: CPT | Performed by: PAIN MEDICINE

## 2024-08-12 RX ORDER — AMLODIPINE BESYLATE 10 MG/1
10 TABLET ORAL DAILY
Qty: 90 TABLET | Refills: 0 | Status: SHIPPED | OUTPATIENT
Start: 2024-08-12 | End: 2024-11-10

## 2024-08-12 RX ORDER — ATORVASTATIN CALCIUM 40 MG/1
40 TABLET, FILM COATED ORAL DAILY
Qty: 90 TABLET | Refills: 0 | Status: SHIPPED | OUTPATIENT
Start: 2024-08-12 | End: 2024-11-10

## 2024-08-12 RX ORDER — LISINOPRIL 40 MG/1
40 TABLET ORAL DAILY
Qty: 90 TABLET | Refills: 0 | Status: SHIPPED | OUTPATIENT
Start: 2024-08-12 | End: 2024-11-10

## 2024-08-12 RX ORDER — GABAPENTIN 300 MG/1
CAPSULE ORAL
Qty: 90 CAPSULE | Refills: 0 | Status: SHIPPED | OUTPATIENT
Start: 2024-08-12 | End: 2024-09-06

## 2024-08-12 RX ORDER — LIDOCAINE 50 MG/G
1 PATCH TOPICAL DAILY
Qty: 30 PATCH | Refills: 0 | Status: SHIPPED | OUTPATIENT
Start: 2024-08-12 | End: 2024-09-11

## 2024-08-12 RX ORDER — BLOOD-GLUCOSE SENSOR
EACH MISCELLANEOUS
Qty: 2 EACH | Refills: 11 | Status: SHIPPED | OUTPATIENT
Start: 2024-08-12

## 2024-08-12 RX ORDER — AMITRIPTYLINE HYDROCHLORIDE 50 MG/1
50 TABLET, FILM COATED ORAL NIGHTLY
Qty: 90 TABLET | Refills: 0 | Status: SHIPPED | OUTPATIENT
Start: 2024-08-12 | End: 2024-11-10

## 2024-08-12 SDOH — SOCIAL STABILITY: SOCIAL NETWORK: SOCIAL ACTIVITY:: 5

## 2024-08-12 ASSESSMENT — PAIN DESCRIPTION - DESCRIPTORS: DESCRIPTORS: TINGLING;SHARP

## 2024-08-12 ASSESSMENT — PAIN - FUNCTIONAL ASSESSMENT: PAIN_FUNCTIONAL_ASSESSMENT: 0-10

## 2024-08-12 ASSESSMENT — PAIN SCALES - GENERAL
PAINLEVEL: 5
PAINLEVEL_OUTOF10: 5 - MODERATE PAIN

## 2024-08-12 NOTE — PROGRESS NOTES
Subjective   Patient ID: Belén Menon is a 58 y.o. male presenting with complaints of bilateral plantar feet and bilateral toes tingling and burning pain sensation.        HPI:   Belén Menon is a 58 y.o. male with a past medical history of hypertension, hypercholesterolemia, and type 2 diabetes.  Patient presents with complaints of bilateral plantar feet and bilateral toes tingling and burning pain sensation.  Patient reports his discomfort is about 5 out of 10 intensity.  Patient says this pain started about 2 to 3 months ago.  Patient was prescribed amitriptyline for this neuropathic pain and took it for 30 days, but says it did not improve his condition at all.  Patient's amitriptyline prescription was reportedly increased to 50 mg after he did not get any relief from the initial prescription but he has not filled this new dosage and has not taken any amitriptyline for about a week now.  Patient says he has not noticed any improvement in his discomfort while taking amitriptyline and that in the past week that he has not taken his amitriptyline there has been no change in his symptoms.  Patient says his pain over the past 2 to 3 months has remained relatively consistent with no improvement or worsening.  Patient says that his pain is exasperated when standing upright and walking.  Patient says that he massages his feet and that relieves his symptoms to a degree.    Physical Therapy: The patient has not done physical therapy within the past six months  Other Conservative Measures he has tried:  Massaging his own feet  Classes of medications tried in the past:  Amitriptyline      Review of Systems   13-point ROS done and negative except for HPI.     Current Outpatient Medications   Medication Instructions    amitriptyline (ELAVIL) 50 mg, oral, Nightly    amLODIPine (NORVASC) 10 mg, oral, Daily    atorvastatin (LIPITOR) 40 mg, oral, Daily    blood pressure monitor kit Use as directed    blood-glucose  meter,continuous (FreeStyle Elroy 3 [place]) [facility] [place]    blood-glucose sensor (FreeStyle Elroy 3 Sensor) device Use to check sugars. Change sensor every 14 days    empagliflozin (JARDIANCE) 25 mg, oral, Daily    Lantus Solostar U-100 Insulin 25 Units, subcutaneous, Nightly    lisinopril 40 mg, oral, Daily       Past Medical History:   Diagnosis Date    Essential (primary) hypertension 07/27/2020    Benign hypertension    Hyperlipidemia     Other conditions influencing health status 05/20/2020    Diabetes mellitus type 2, uncontrolled        No past surgical history on file.     Family History   Problem Relation Name Age of Onset    Diabetes Other      Hypertension Other          No Known Allergies     Objective     Vitals:    08/12/24 1335   BP: 115/84   Pulse: 96   Resp: 17        Physical Exam  General: NAD, well groomed, well nourished  Eyes: Non-icteric sclera, EOMI  Ears, Nose, Mouth, and Throat: External ears and nose appear to be without deformity or rash. No lesions or masses noted. Hearing is grossly intact.   Neck: Trachea midline  Respiratory: Nonlabored breathing   Cardiovascular: no peripheral edema   Skin: No rashes or open lesions/ulcers identified on skin.      Neurologic:   Cranial nerves grossly intact.   Strength 5/5 and symmetric plantar/dorsiflexion   Sensation: Abnormal sensation to light touch and pinprick throughout bilateral plantar feet as well as all toes of bilateral feet (plantar and dorsal aspect)       Psychiatric: Alert, orientation to person, place, and time. Cooperative.      Assessment/Plan   Belén Menon is a 58 y.o. male presenting with complaints of bilateral plantar feet and bilateral toes tingling and burning pain sensation.  Patient presentation and examination consistent with diabetic neuropathy.    Plan:  -Patient will be prescribed lidocaine patches.  -Patient will be prescribed gabapentin titration prescription.    The patient has failed treatment with :  alternative therapies      The patient was invited to contact us back anytime with any questions or concerns and follow-up with us in the office as needed.     Diagnoses and all orders for this visit:  Type 2 diabetes mellitus with diabetic polyneuropathy, with long-term current use of insulin (Multi)  -     Referral to Pain Medicine      This note was generated with the aid of dictation software, there may be typos despite my attempts at proofreading.

## 2024-08-12 NOTE — PROGRESS NOTES
Subjective   Patient ID: Belén Menon is a 58 y.o. male who presents for Follow-up (refills).  HPI  58 year old male with PMH of HTN, DM2, HLD presents today for follow up.     HTN: Amlodipine 10mg, lisinopril 40mg  HLD: Atorvastatin 40mg  DM2: Jardiance 25mg, lantus 15U nightly. Patient reports he stopped insulin 2 weeks ago. Freestyle noemy results reviewed with patient, has been within target range. Largely due to change in diet per patient.   Neuropathy:  Amitriptyline 50mg     All systems have been reviewed and are negative for complaint other than those mentioned in the HPI.     Objective   /82   Wt 61.7 kg (136 lb)   BMI 20.68 kg/m²    Physical Exam  Constitutional:       General: He is awake.      Appearance: Normal appearance.   HENT:      Head: Normocephalic and atraumatic.   Eyes:      Extraocular Movements: Extraocular movements intact.      Pupils: Pupils are equal, round, and reactive to light.   Cardiovascular:      Rate and Rhythm: Normal rate and regular rhythm.      Heart sounds: S1 normal and S2 normal. No murmur heard.  Pulmonary:      Effort: Pulmonary effort is normal.      Breath sounds: Normal breath sounds.   Musculoskeletal:      Cervical back: Normal range of motion and neck supple.      Right lower leg: No edema.      Left lower leg: No edema.   Skin:     General: Skin is warm and dry.   Neurological:      General: No focal deficit present.      Mental Status: He is alert and oriented to person, place, and time.   Psychiatric:         Mood and Affect: Mood and affect normal.         Behavior: Behavior normal. Behavior is cooperative.         Thought Content: Thought content normal.         Judgment: Judgment normal.     Belén was seen today for follow-up.  Diagnoses and all orders for this visit:  Polyneuropathy associated with underlying disease (Multi) (Primary)  -     Started amitriptyline, reports some benefit but not substantial. Will increase to 50mg.   - amitriptyline  (Elavil) 50 mg tablet; Take 1 tablet (50 mg) by mouth once daily at bedtime.  Type 2 diabetes mellitus without complication, without long-term current use of insulin (Multi)  -     Continue jardiance  - Has changed diet, now exercising. Was taking 20U lantus nightly, stopped taking it 2 weeks ago. Blood sugars reveiwed, within goal. Patient to continue OFF insulin.   - empagliflozin (Jardiance) 25 mg; Take 1 tablet (25 mg) by mouth once daily.  -     POCT glycosylated hemoglobin (Hb A1C) manually resulted  -     Referral to [facility]  Hyperlipidemia, unspecified hyperlipidemia type  -    Stable, continue current medication   -  atorvastatin (Lipitor) 40 mg tablet; Take 1 tablet (40 mg) by mouth once daily.  Benign essential hypertension  -     Stable. Continue current management.    - amLODIPine (Norvasc) 10 mg tablet; Take 1 tablet (10 mg) by mouth once daily.  -     lisinopril 40 mg tablet; Take 1 tablet (40 mg) by mouth once daily.    Follow up in 2 months.

## 2024-08-13 ENCOUNTER — PATIENT OUTREACH (OUTPATIENT)
Dept: CARE COORDINATION | Facility: CLINIC | Age: 59
End: 2024-08-13
Payer: COMMERCIAL

## 2024-08-21 ENCOUNTER — APPOINTMENT (OUTPATIENT)
Dept: CARE COORDINATION | Facility: CLINIC | Age: 59
End: 2024-08-21
Payer: COMMERCIAL

## 2024-08-26 ENCOUNTER — APPOINTMENT (OUTPATIENT)
Dept: PODIATRY | Facility: CLINIC | Age: 59
End: 2024-08-26
Payer: COMMERCIAL

## 2024-09-23 ENCOUNTER — APPOINTMENT (OUTPATIENT)
Dept: PRIMARY CARE | Facility: CLINIC | Age: 59
End: 2024-09-23
Payer: COMMERCIAL

## 2024-09-30 ENCOUNTER — APPOINTMENT (OUTPATIENT)
Dept: ENDOCRINOLOGY | Facility: HOSPITAL | Age: 59
End: 2024-09-30
Payer: COMMERCIAL

## 2024-10-11 ENCOUNTER — APPOINTMENT (OUTPATIENT)
Dept: PRIMARY CARE | Facility: CLINIC | Age: 59
End: 2024-10-11
Payer: COMMERCIAL

## 2024-10-11 ENCOUNTER — LAB (OUTPATIENT)
Dept: LAB | Facility: LAB | Age: 59
End: 2024-10-11
Payer: COMMERCIAL

## 2024-10-11 VITALS
WEIGHT: 146 LBS | DIASTOLIC BLOOD PRESSURE: 87 MMHG | HEIGHT: 68 IN | BODY MASS INDEX: 22.13 KG/M2 | SYSTOLIC BLOOD PRESSURE: 122 MMHG

## 2024-10-11 DIAGNOSIS — Z79.4 TYPE 2 DIABETES MELLITUS WITHOUT COMPLICATION, WITH LONG-TERM CURRENT USE OF INSULIN (MULTI): ICD-10-CM

## 2024-10-11 DIAGNOSIS — E11.9 TYPE 2 DIABETES MELLITUS WITHOUT COMPLICATION, WITH LONG-TERM CURRENT USE OF INSULIN (MULTI): ICD-10-CM

## 2024-10-11 DIAGNOSIS — I10 BENIGN ESSENTIAL HYPERTENSION: ICD-10-CM

## 2024-10-11 DIAGNOSIS — E11.9 TYPE 2 DIABETES MELLITUS WITHOUT COMPLICATION, UNSPECIFIED WHETHER LONG TERM INSULIN USE (MULTI): ICD-10-CM

## 2024-10-11 DIAGNOSIS — E11.9 TYPE 2 DIABETES MELLITUS WITHOUT COMPLICATION, WITHOUT LONG-TERM CURRENT USE OF INSULIN (MULTI): ICD-10-CM

## 2024-10-11 DIAGNOSIS — E78.5 HYPERLIPIDEMIA, UNSPECIFIED HYPERLIPIDEMIA TYPE: ICD-10-CM

## 2024-10-11 LAB
25(OH)D3 SERPL-MCNC: 18 NG/ML (ref 30–100)
ALBUMIN SERPL BCP-MCNC: 4.2 G/DL (ref 3.4–5)
ALP SERPL-CCNC: 87 U/L (ref 33–120)
ALT SERPL W P-5'-P-CCNC: 21 U/L (ref 10–52)
ANION GAP SERPL CALC-SCNC: 14 MMOL/L (ref 10–20)
AST SERPL W P-5'-P-CCNC: 16 U/L (ref 9–39)
BILIRUB SERPL-MCNC: 0.9 MG/DL (ref 0–1.2)
BUN SERPL-MCNC: 12 MG/DL (ref 6–23)
CALCIUM SERPL-MCNC: 9.5 MG/DL (ref 8.6–10.6)
CHLORIDE SERPL-SCNC: 102 MMOL/L (ref 98–107)
CHOLEST SERPL-MCNC: 144 MG/DL (ref 0–199)
CHOLESTEROL/HDL RATIO: 2.3
CO2 SERPL-SCNC: 26 MMOL/L (ref 21–32)
CREAT SERPL-MCNC: 0.79 MG/DL (ref 0.5–1.3)
EGFRCR SERPLBLD CKD-EPI 2021: >90 ML/MIN/1.73M*2
GLUCOSE SERPL-MCNC: 119 MG/DL (ref 74–99)
HDLC SERPL-MCNC: 61.6 MG/DL
LDLC SERPL CALC-MCNC: 72 MG/DL
NON HDL CHOLESTEROL: 82 MG/DL (ref 0–149)
POTASSIUM SERPL-SCNC: 4.1 MMOL/L (ref 3.5–5.3)
PROT SERPL-MCNC: 7.6 G/DL (ref 6.4–8.2)
SODIUM SERPL-SCNC: 138 MMOL/L (ref 136–145)
TRIGL SERPL-MCNC: 54 MG/DL (ref 0–149)
TSH SERPL-ACNC: 0.6 MIU/L (ref 0.44–3.98)
VLDL: 11 MG/DL (ref 0–40)

## 2024-10-11 PROCEDURE — 3074F SYST BP LT 130 MM HG: CPT

## 2024-10-11 PROCEDURE — 82306 VITAMIN D 25 HYDROXY: CPT

## 2024-10-11 PROCEDURE — 99214 OFFICE O/P EST MOD 30 MIN: CPT

## 2024-10-11 PROCEDURE — 84443 ASSAY THYROID STIM HORMONE: CPT

## 2024-10-11 PROCEDURE — 80061 LIPID PANEL: CPT

## 2024-10-11 PROCEDURE — 36415 COLL VENOUS BLD VENIPUNCTURE: CPT

## 2024-10-11 PROCEDURE — 3048F LDL-C <100 MG/DL: CPT

## 2024-10-11 PROCEDURE — 3079F DIAST BP 80-89 MM HG: CPT

## 2024-10-11 PROCEDURE — 3008F BODY MASS INDEX DOCD: CPT

## 2024-10-11 PROCEDURE — 3046F HEMOGLOBIN A1C LEVEL >9.0%: CPT

## 2024-10-11 PROCEDURE — 80053 COMPREHEN METABOLIC PANEL: CPT

## 2024-10-11 PROCEDURE — 81003 URINALYSIS AUTO W/O SCOPE: CPT

## 2024-10-11 PROCEDURE — 82570 ASSAY OF URINE CREATININE: CPT

## 2024-10-11 PROCEDURE — 82043 UR ALBUMIN QUANTITATIVE: CPT

## 2024-10-11 PROCEDURE — 1036F TOBACCO NON-USER: CPT

## 2024-10-11 PROCEDURE — 4010F ACE/ARB THERAPY RXD/TAKEN: CPT

## 2024-10-11 PROCEDURE — 3061F NEG MICROALBUMINURIA REV: CPT

## 2024-10-11 RX ORDER — LISINOPRIL 40 MG/1
40 TABLET ORAL DAILY
Qty: 90 TABLET | Refills: 0 | Status: SHIPPED | OUTPATIENT
Start: 2024-10-11 | End: 2025-01-09

## 2024-10-11 RX ORDER — BLOOD-GLUCOSE SENSOR
EACH MISCELLANEOUS
Qty: 2 EACH | Refills: 11 | Status: SHIPPED | OUTPATIENT
Start: 2024-10-11

## 2024-10-11 RX ORDER — AMLODIPINE BESYLATE 10 MG/1
10 TABLET ORAL DAILY
Qty: 90 TABLET | Refills: 0 | Status: SHIPPED | OUTPATIENT
Start: 2024-10-11 | End: 2025-01-09

## 2024-10-11 RX ORDER — ATORVASTATIN CALCIUM 40 MG/1
40 TABLET, FILM COATED ORAL DAILY
Qty: 90 TABLET | Refills: 0 | Status: SHIPPED | OUTPATIENT
Start: 2024-10-11 | End: 2025-01-09

## 2024-10-11 ASSESSMENT — LIFESTYLE VARIABLES: HOW OFTEN DO YOU HAVE A DRINK CONTAINING ALCOHOL: MONTHLY OR LESS

## 2024-10-11 ASSESSMENT — PATIENT HEALTH QUESTIONNAIRE - PHQ9
1. LITTLE INTEREST OR PLEASURE IN DOING THINGS: NOT AT ALL
SUM OF ALL RESPONSES TO PHQ9 QUESTIONS 1 AND 2: 0
2. FEELING DOWN, DEPRESSED OR HOPELESS: NOT AT ALL

## 2024-10-11 NOTE — PATIENT INSTRUCTIONS
It was good to see you today!   Please have your blood drawn on the way out.     Increase your gabapentin to twice a day for a week. After a week, start taking it three times a day.     If you do not feel improvement in 2 weeks, please call our office.

## 2024-10-11 NOTE — PROGRESS NOTES
"Subjective   Patient ID: Belén Menon is a 59 y.o. male who presents for Follow-up (Declined flu shot).  HPI  58 year old male with PMH of HTN, DM2, HLD presents today for follow up.      HTN: Amlodipine 10mg, lisinopril 40mg  HLD: Atorvastatin 40mg  DM2: Jardiance 25mg. Stopped taking insulin, per CGM blood sugars are currently well controlled.   Neuropathy:  Gabapentin titration    All systems have been reviewed and are negative for complaint other than those mentioned in the HPI.     Objective   /87 (BP Location: Left arm, Patient Position: Sitting, BP Cuff Size: Adult)   Ht 1.727 m (5' 8\")   Wt 66.2 kg (146 lb)   BMI 22.20 kg/m²    Physical Exam  Constitutional:       General: He is awake.      Appearance: Normal appearance.   HENT:      Head: Normocephalic and atraumatic.   Eyes:      Extraocular Movements: Extraocular movements intact.      Conjunctiva/sclera: Conjunctivae normal.      Pupils: Pupils are equal, round, and reactive to light.   Neck:      Thyroid: No thyroid mass or thyromegaly.      Vascular: No carotid bruit.   Cardiovascular:      Rate and Rhythm: Normal rate and regular rhythm.      Pulses: Normal pulses.      Heart sounds: S1 normal and S2 normal. No murmur heard.  Pulmonary:      Effort: Pulmonary effort is normal.      Breath sounds: Normal breath sounds.   Abdominal:      General: Abdomen is flat. Bowel sounds are normal.      Palpations: Abdomen is soft. There is no hepatomegaly, splenomegaly, mass or pulsatile mass.      Tenderness: There is no abdominal tenderness.   Musculoskeletal:      Cervical back: Normal range of motion and neck supple.      Right lower leg: No edema.      Left lower leg: No edema.   Skin:     General: Skin is warm and dry.      Capillary Refill: Capillary refill takes less than 2 seconds.   Neurological:      General: No focal deficit present.      Mental Status: He is alert and oriented to person, place, and time.   Psychiatric:         Mood and " Affect: Mood normal.         Behavior: Behavior normal. Behavior is cooperative.         Thought Content: Thought content normal.         Judgment: Judgment normal.     Belén was seen today for follow-up.  Diagnoses and all orders for this visit:  Benign essential hypertension  -     Stable. Continue current management.    - amLODIPine (Norvasc) 10 mg tablet; Take 1 tablet (10 mg) by mouth once daily.  Hyperlipidemia, unspecified hyperlipidemia type  -     Stable. Continue current management.  Due for labs, ordered last visit, patient to have drawn today.   - atorvastatin (Lipitor) 40 mg tablet; Take 1 tablet (40 mg) by mouth once daily.  Type 2 diabetes mellitus without complication, without long term insulin use (Multi)  -   Reviewed data from CGM, well controlled  - Managing with diet and exercise, continue current management.   -   blood-glucose sensor (JemstepStyle Elroy 3 Sensor) device; Use to check sugars. Change sensor every 14 days  -     empagliflozin (Jardiance) 25 mg; Take 1 tablet (25 mg) by mouth once daily.    Has appointment with podiatry and ophthalmology scheduled.     Follow up in 3 months.

## 2024-10-12 LAB
APPEARANCE UR: CLEAR
BILIRUB UR STRIP.AUTO-MCNC: NEGATIVE MG/DL
COLOR UR: ABNORMAL
CREAT UR-MCNC: 121.1 MG/DL (ref 20–370)
GLUCOSE UR STRIP.AUTO-MCNC: ABNORMAL MG/DL
KETONES UR STRIP.AUTO-MCNC: NEGATIVE MG/DL
LEUKOCYTE ESTERASE UR QL STRIP.AUTO: NEGATIVE
MICROALBUMIN UR-MCNC: <7 MG/L
MICROALBUMIN/CREAT UR: NORMAL MG/G{CREAT}
NITRITE UR QL STRIP.AUTO: NEGATIVE
PH UR STRIP.AUTO: 7 [PH]
PROT UR STRIP.AUTO-MCNC: NEGATIVE MG/DL
RBC # UR STRIP.AUTO: NEGATIVE /UL
SP GR UR STRIP.AUTO: 1.04
UROBILINOGEN UR STRIP.AUTO-MCNC: NORMAL MG/DL

## 2024-10-14 DIAGNOSIS — E55.9 VITAMIN D DEFICIENCY: Primary | ICD-10-CM

## 2024-10-14 RX ORDER — CHOLECALCIFEROL (VITAMIN D3) 50 MCG
50 TABLET ORAL DAILY
Qty: 90 TABLET | Refills: 3 | Status: SHIPPED | OUTPATIENT
Start: 2024-10-14 | End: 2025-10-14

## 2024-11-06 DIAGNOSIS — Z79.4 TYPE 2 DIABETES MELLITUS WITH DIABETIC POLYNEUROPATHY, WITH LONG-TERM CURRENT USE OF INSULIN: ICD-10-CM

## 2024-11-06 DIAGNOSIS — E11.42 TYPE 2 DIABETES MELLITUS WITH DIABETIC POLYNEUROPATHY, WITH LONG-TERM CURRENT USE OF INSULIN: ICD-10-CM

## 2024-11-06 RX ORDER — GABAPENTIN 300 MG/1
CAPSULE ORAL
Qty: 90 CAPSULE | Refills: 0 | Status: SHIPPED | OUTPATIENT
Start: 2024-11-06 | End: 2024-11-30

## 2024-12-30 ENCOUNTER — OFFICE VISIT (OUTPATIENT)
Dept: PODIATRY | Facility: HOSPITAL | Age: 59
End: 2024-12-30
Payer: COMMERCIAL

## 2024-12-30 VITALS
DIASTOLIC BLOOD PRESSURE: 98 MMHG | WEIGHT: 151 LBS | HEART RATE: 94 BPM | BODY MASS INDEX: 22.96 KG/M2 | SYSTOLIC BLOOD PRESSURE: 158 MMHG | OXYGEN SATURATION: 98 %

## 2024-12-30 DIAGNOSIS — E11.42 TYPE 2 DIABETES MELLITUS WITH DIABETIC POLYNEUROPATHY, WITH LONG-TERM CURRENT USE OF INSULIN: Primary | ICD-10-CM

## 2024-12-30 DIAGNOSIS — Z79.4 TYPE 2 DIABETES MELLITUS WITH DIABETIC POLYNEUROPATHY, WITH LONG-TERM CURRENT USE OF INSULIN: Primary | ICD-10-CM

## 2024-12-30 PROCEDURE — 99213 OFFICE O/P EST LOW 20 MIN: CPT | Performed by: STUDENT IN AN ORGANIZED HEALTH CARE EDUCATION/TRAINING PROGRAM

## 2024-12-30 ASSESSMENT — PAIN SCALES - GENERAL: PAINLEVEL_OUTOF10: 6

## 2024-12-30 NOTE — PROGRESS NOTES
Initial Podiatric Office Visit:    HPI:  This 59 y.o. male with PMH indicated below presents today for foot tingling. Patient is a type 2 diabetic for about 2 years. Per chart review, A1c was 19.4% in 05/24 and most recent was 8.3% in 08/24.. Patient states that his most recent A1c was in the 7's and that it was done at a  facility in [place]. This record/result cannot be located in the EMR. He has tingling and some numbness in his feet and lower legs and feels like a glove is squeezing his feet. They also feel cold at times. No history of tobacco use, does smoke marijuana, no calf or leg muscle cramping. He recently purchased a neuropathy supplement and wants to know if he should take it. He also has a blister on his left foot after wearing a new pair of shoes, but it has dried up. Patient denies other constitutional symptoms and other pedal complaints today.    PCP:  Francesca Garcia, APRN-CNP:    [facility]  Past Medical History:   Diagnosis Date    Essential (primary) hypertension 07/27/2020    Benign hypertension    Hyperlipidemia     Other conditions influencing health status 05/20/2020    Diabetes mellitus type 2, uncontrolled   :    MEDICATIONS  Current Outpatient Medications   Medication Sig Dispense Refill    amLODIPine (Norvasc) 10 mg tablet Take 1 tablet (10 mg) by mouth once daily. 90 tablet 0    atorvastatin (Lipitor) 40 mg tablet Take 1 tablet (40 mg) by mouth once daily. 90 tablet 0    blood pressure monitor kit Use as directed      blood-glucose meter,continuous (FreeStyle Elroy 3 [place]) [facility] [place] Use as instructed 1 each 0    blood-glucose sensor (FreeStyle Elroy 3 Sensor) device Use to check sugars. Change sensor every 14 days 2 each 11    cholecalciferol (Vitamin D-3) 50 MCG (2000 UT) tablet Take 1 tablet (50 mcg) by mouth once daily. 90 tablet 3    empagliflozin (Jardiance) 25 mg Take 1 tablet (25 mg) by mouth once daily. 90 tablet 0    lisinopril 40 mg tablet Take 1 tablet (40 mg) by mouth once daily. 90  tablet 0    gabapentin (Neurontin) 300 mg capsule TAKE 1 CAPSULE (300 MG) BY MOUTH ONCE DAILY AT BEDTIME FOR 5 DAYS, THEN 1 CAPSULE (300 MG) 2 TIMES A DAY FOR 5 DAYS, THEN 1 CAPSULE (300 MG) 3 TIMES A DAY FOR 5 DAYS, THEN 2 CAPSULES (600 MG) 3 TIMES A DAY FOR 10 DAYS. 90 capsule 0     No current facility-administered medications for this visit.   :  No Known Allergies:  No past surgical history on file.  Family History   Problem Relation Name Age of Onset    Diabetes Other      Hypertension Other     :  Social History     Socioeconomic History    Marital status: Single   Tobacco Use    Smoking status: Never     Passive exposure: Never    Smokeless tobacco: Never   Substance and Sexual Activity    Alcohol use: Yes     Comment: ocassional    Drug use: Not Currently     Types: Marijuana     Social Drivers of Health     Financial Resource Strain: Not on File (2019)    Received from [facility]    Financial Resource Strain     Financial Resource Strain: 0   Food Insecurity: Not on File (2019)    Received from [facility]    Food Insecurity     Food: 0   Transportation Needs: Not on File (2019)    Received from [facility]    Transportation Needs     Transportation: 0   Physical Activity: Not on File (2019)    Received from [facility]    Physical Activity     Physical Activity: 0   Stress: Not on File (2019)    Received from [facility]    Stress     Stress: 0   Social Connections: Not on File (2019)    Received from [facility]    Social Connections     Social Connections and Isolation: 0   Housing Stability: Not on File (2019)    Received from [facility]    Housing Stability     Housin       Review of Systems:    GENERAL: Negative for fatigue, weight loss, malaise, or fevers.  HEENT: Negative for frequent or significant headaches.   RESPIRATORY: Negative for cough, wheezing or shortness of breath.  CARDIOVASCULAR: Negative for chest pain or palpitations.  GI: Negative for  abdominal discomfort, nausea, vomiting.   MUSCULOSKELETAL: Negative for back pain or joint pain.   SKIN: Negative for lesions, rash, and itching.  NEURO: Negative for dizziness, weakness.     Physical Exam:   General: AAOx3, no acute distress    Vasc: Dorsalis pedis and posterior tibial pulses are palpable bilateral.  Capillary refill time is less than 3 seconds to the hallux bilateral. Skin temperature is warm to cool from proximal tibia to distal digits bilateral. No erythema noted. No edema noted.      Neuro: Light touch sensation is increased to the foot bilateral.      Derm: Skin is supple with normal texture and turgor noted. Nails 1-5 bilateral are dystrophic. Webspaces 1-4 are clean, dry and intact bilateral. There is a healing blister on the plantar left foot at the fifth met styloid process - no underlying fluid, no ulcer.    Ortho: Muscle strength is +5/5 for all four pedal muscle groups bilateral. Ankle joint, subtalar joint, and 1st MPJ ROM is full and without pain or crepitus bilateral. There are no structural deformities noted bilateral.    Assessment and Plan:     Problem List Items Addressed This Visit             ICD-10-CM    Diabetes mellitus, type 2 (Multi) E11.9    Relevant Orders    Follow Up In Podiatry         - Patient was examined and findings were discussed with patient  - Chart, labs, and imaging were reviewed  - Patient's primary complaint is his tingling/neuropathy symptoms  - Patient has previously been prescribed amitriptyline and gabapentin. Has taken the amitriptyline without relief, did not take the gabapentin  - Patient would prefer natural pain relief and has a supplement with B vitamins and alpha lipoic acid  - Patient is okay to take the supplement  - Educated patient on and discussed diabetic foot care  - Instructed patient to wear protective shoe gear at all times  - Instructed patient to check feet daily  - Blister is dry and healing well  - RTC in 1 year    Sanjay Green  MADELYNM PGY-3    I saw and evaluated the patient. I personally obtained the key and critical portions of the history and physical exam or was physically present for key and critical portions performed by the resident/fellow. I reviewed the resident/fellow's documentation and discussed the patient with the resident/fellow. I agree with the resident/fellow's medical decision making as documented in the note.    I spent 30 minutes in the professional and overall care of this patient.    Kelli Grace DPM

## 2025-01-10 ENCOUNTER — APPOINTMENT (OUTPATIENT)
Dept: PRIMARY CARE | Facility: CLINIC | Age: 60
End: 2025-01-10
Payer: COMMERCIAL

## 2025-01-10 VITALS
DIASTOLIC BLOOD PRESSURE: 74 MMHG | WEIGHT: 155 LBS | BODY MASS INDEX: 23.49 KG/M2 | SYSTOLIC BLOOD PRESSURE: 128 MMHG | HEIGHT: 68 IN

## 2025-01-10 DIAGNOSIS — E11.42 TYPE 2 DIABETES MELLITUS WITH DIABETIC POLYNEUROPATHY, WITH LONG-TERM CURRENT USE OF INSULIN: ICD-10-CM

## 2025-01-10 DIAGNOSIS — Z79.4 TYPE 2 DIABETES MELLITUS WITH DIABETIC POLYNEUROPATHY, WITH LONG-TERM CURRENT USE OF INSULIN: ICD-10-CM

## 2025-01-10 DIAGNOSIS — E78.5 HYPERLIPIDEMIA, UNSPECIFIED HYPERLIPIDEMIA TYPE: ICD-10-CM

## 2025-01-10 DIAGNOSIS — G62.89 OTHER POLYNEUROPATHY: Primary | ICD-10-CM

## 2025-01-10 DIAGNOSIS — E11.9 TYPE 2 DIABETES MELLITUS WITHOUT COMPLICATION, WITHOUT LONG-TERM CURRENT USE OF INSULIN (MULTI): ICD-10-CM

## 2025-01-10 DIAGNOSIS — E55.9 VITAMIN D DEFICIENCY: ICD-10-CM

## 2025-01-10 DIAGNOSIS — I10 BENIGN ESSENTIAL HYPERTENSION: ICD-10-CM

## 2025-01-10 DIAGNOSIS — Z59.41 FOOD INSECURITY: ICD-10-CM

## 2025-01-10 LAB — POC HEMOGLOBIN A1C: 6.8 % (ref 4.2–6.5)

## 2025-01-10 RX ORDER — AMLODIPINE BESYLATE 10 MG/1
10 TABLET ORAL DAILY
Qty: 90 TABLET | Refills: 1 | Status: SHIPPED | OUTPATIENT
Start: 2025-01-10 | End: 2025-07-09

## 2025-01-10 RX ORDER — ATORVASTATIN CALCIUM 40 MG/1
40 TABLET, FILM COATED ORAL DAILY
Qty: 90 TABLET | Refills: 1 | Status: SHIPPED | OUTPATIENT
Start: 2025-01-10 | End: 2025-07-09

## 2025-01-10 RX ORDER — CHOLECALCIFEROL (VITAMIN D3) 50 MCG
50 TABLET ORAL DAILY
Qty: 90 TABLET | Refills: 3 | Status: SHIPPED | OUTPATIENT
Start: 2025-01-10 | End: 2026-01-10

## 2025-01-10 RX ORDER — LISINOPRIL 40 MG/1
40 TABLET ORAL DAILY
Qty: 90 TABLET | Refills: 1 | Status: SHIPPED | OUTPATIENT
Start: 2025-01-10 | End: 2025-07-09

## 2025-01-10 SDOH — ECONOMIC STABILITY: FOOD INSECURITY: WITHIN THE PAST 12 MONTHS, YOU WORRIED THAT YOUR FOOD WOULD RUN OUT BEFORE YOU GOT MONEY TO BUY MORE.: SOMETIMES TRUE

## 2025-01-10 SDOH — ECONOMIC STABILITY: FOOD INSECURITY: WITHIN THE PAST 12 MONTHS, THE FOOD YOU BOUGHT JUST DIDN'T LAST AND YOU DIDN'T HAVE MONEY TO GET MORE.: SOMETIMES TRUE

## 2025-01-10 SDOH — ECONOMIC STABILITY - FOOD INSECURITY: FOOD INSECURITY: Z59.41

## 2025-01-10 NOTE — PROGRESS NOTES
"Subjective   Patient ID: Belén Menon is a 59 y.o. male who presents for Follow-up.  HPI  59 year old male with PMH of HTN, DM2, HLD presents today for follow up.      HTN: Amlodipine 10mg, lisinopril 40mg  HLD: Atorvastatin 40mg  DM2: Jardiance 25mg. Stopped taking insulin, per CGM blood sugars are currently well controlled.   Neuropathy:  Gabapentin titration--> states that he hasn't started the medication yet, did start taking alpha lipoic acid supplement.      A1C today 6.8!! Seeing podiatry for neuropathy. Has an appointment with ophthalmology coming up. Taking Jardiance 25mg. Watching his diet, focusing on limiting sugar and carbs, eating lots of protein and vegetables.   Not checking BP at home.     All systems have been reviewed and are negative for complaint other than those mentioned in the HPI.     Objective   /74   Ht 1.727 m (5' 8\")   Wt 70.3 kg (155 lb)   BMI 23.57 kg/m²    Physical Exam  Constitutional:       General: He is awake.      Appearance: Normal appearance.   HENT:      Head: Normocephalic and atraumatic.   Eyes:      Extraocular Movements: Extraocular movements intact.      Pupils: Pupils are equal, round, and reactive to light.   Cardiovascular:      Rate and Rhythm: Normal rate and regular rhythm.      Heart sounds: S1 normal and S2 normal. No murmur heard.  Pulmonary:      Effort: Pulmonary effort is normal.      Breath sounds: Normal breath sounds.   Musculoskeletal:      Cervical back: Normal range of motion and neck supple.      Right lower leg: No edema.      Left lower leg: No edema.   Skin:     General: Skin is warm and dry.   Neurological:      General: No focal deficit present.      Mental Status: He is alert and oriented to person, place, and time.   Psychiatric:         Mood and Affect: Mood and affect normal.         Behavior: Behavior normal. Behavior is cooperative.         Thought Content: Thought content normal.         Judgment: Judgment normal.     Belén" was seen today for follow-up.  Diagnoses and all orders for this visit:  Other polyneuropathy (Primary)  -     severe polyneuropathy in feet, likely diabetic neuropathy given history of uncontrolled diabetes  - Wishes to have neuro workup to ensure no alternative causes. Referral placed.   - Referral to Neurology; Future  Type 2 diabetes mellitus without complication, with long-term current use of insulin (Multi)  -     stable. Doing great! A1C improved from 19 to 6.8. Continue current mangement.   - POCT glycosylated hemoglobin (Hb A1C) manually resulted  -     Referral to [facility]; Future  -     empagliflozin (Jardiance) 25 mg; Take 1 tablet (25 mg) by mouth once daily.  Hyperlipidemia, unspecified hyperlipidemia type  -     Stable. Continue current management.    - atorvastatin (Lipitor) 40 mg tablet; Take 1 tablet (40 mg) by mouth once daily.  Benign essential hypertension  -    Stable. Continue current management.    -  amLODIPine (Norvasc) 10 mg tablet; Take 1 tablet (10 mg) by mouth once daily.  -     lisinopril 40 mg tablet; Take 1 tablet (40 mg) by mouth once daily.  Vitamin D deficiency  -     cholecalciferol (Vitamin D-3) 50 MCG (2000 UT) tablet; Take 1 tablet (50 mcg) by mouth once daily.  Food insecurity  -     Referral to [email or web address]; Future    Patient informed this provider will be leaving . The patient was given phone numbers and information to schedule follow up with a new PCP.

## 2025-01-10 NOTE — PATIENT INSTRUCTIONS
Ghada Espino MD & Aline Arcos MD   [facility]   [address], [address]   [place] [address]   Phone: [phone]    [facility]: [phone]

## 2025-01-17 ENCOUNTER — APPOINTMENT (OUTPATIENT)
Dept: OPHTHALMOLOGY | Facility: CLINIC | Age: 60
End: 2025-01-17
Payer: COMMERCIAL

## 2025-02-04 ENCOUNTER — APPOINTMENT (OUTPATIENT)
Dept: OPHTHALMOLOGY | Facility: CLINIC | Age: 60
End: 2025-02-04
Payer: COMMERCIAL

## 2025-02-26 ENCOUNTER — DOCUMENTATION (OUTPATIENT)
Dept: CARE COORDINATION | Facility: CLINIC | Age: 60
End: 2025-02-26
Payer: COMMERCIAL

## 2025-02-26 NOTE — PROGRESS NOTES
Patient was a no show for his 8/13 visit.  No response from call with message to return call to reschedule if interested.  Referral closed at this time.

## 2025-10-13 ENCOUNTER — APPOINTMENT (OUTPATIENT)
Dept: PRIMARY CARE | Facility: CLINIC | Age: 60
End: 2025-10-13
Payer: COMMERCIAL